# Patient Record
Sex: MALE | Race: WHITE | Employment: OTHER | URBAN - METROPOLITAN AREA
[De-identification: names, ages, dates, MRNs, and addresses within clinical notes are randomized per-mention and may not be internally consistent; named-entity substitution may affect disease eponyms.]

---

## 2017-01-09 RX ORDER — SOTALOL HYDROCHLORIDE 80 MG/1
TABLET ORAL
Qty: 180 TABLET | Refills: 1 | Status: SHIPPED | OUTPATIENT
Start: 2017-01-09 | End: 2017-07-11 | Stop reason: SDUPTHER

## 2017-01-24 ENCOUNTER — OFFICE VISIT (OUTPATIENT)
Dept: INTERNAL MEDICINE CLINIC | Age: 72
End: 2017-01-24

## 2017-01-24 VITALS
WEIGHT: 257 LBS | HEIGHT: 73 IN | HEART RATE: 72 BPM | TEMPERATURE: 98.4 F | OXYGEN SATURATION: 98 % | SYSTOLIC BLOOD PRESSURE: 116 MMHG | BODY MASS INDEX: 34.06 KG/M2 | DIASTOLIC BLOOD PRESSURE: 72 MMHG

## 2017-01-24 DIAGNOSIS — J06.9 ACUTE URI: Primary | ICD-10-CM

## 2017-01-24 DIAGNOSIS — C64.9 RENAL CANCER, UNSPECIFIED LATERALITY (HCC): ICD-10-CM

## 2017-01-24 PROCEDURE — 1123F ACP DISCUSS/DSCN MKR DOCD: CPT | Performed by: INTERNAL MEDICINE

## 2017-01-24 PROCEDURE — G8419 CALC BMI OUT NRM PARAM NOF/U: HCPCS | Performed by: INTERNAL MEDICINE

## 2017-01-24 PROCEDURE — 1036F TOBACCO NON-USER: CPT | Performed by: INTERNAL MEDICINE

## 2017-01-24 PROCEDURE — 4040F PNEUMOC VAC/ADMIN/RCVD: CPT | Performed by: INTERNAL MEDICINE

## 2017-01-24 PROCEDURE — G8427 DOCREV CUR MEDS BY ELIG CLIN: HCPCS | Performed by: INTERNAL MEDICINE

## 2017-01-24 PROCEDURE — 99213 OFFICE O/P EST LOW 20 MIN: CPT | Performed by: INTERNAL MEDICINE

## 2017-01-24 PROCEDURE — G8484 FLU IMMUNIZE NO ADMIN: HCPCS | Performed by: INTERNAL MEDICINE

## 2017-01-24 PROCEDURE — 3017F COLORECTAL CA SCREEN DOC REV: CPT | Performed by: INTERNAL MEDICINE

## 2017-01-24 RX ORDER — HYDROCODONE POLISTIREX AND CHLORPHENIRAMINE POLISTIREX 10; 8 MG/5ML; MG/5ML
5 SUSPENSION, EXTENDED RELEASE ORAL EVERY 12 HOURS PRN
Qty: 120 ML | Refills: 0 | Status: SHIPPED | OUTPATIENT
Start: 2017-01-24 | End: 2017-06-01

## 2017-01-24 RX ORDER — AMOXICILLIN AND CLAVULANATE POTASSIUM 875; 125 MG/1; MG/1
1 TABLET, FILM COATED ORAL 2 TIMES DAILY WITH MEALS
Qty: 20 TABLET | Refills: 0 | Status: SHIPPED | OUTPATIENT
Start: 2017-01-24 | End: 2017-02-03

## 2017-01-24 ASSESSMENT — ENCOUNTER SYMPTOMS
EYES NEGATIVE: 1
RHINORRHEA: 1
CHOKING: 0
APNEA: 0
SORE THROAT: 1
STRIDOR: 0
BACK PAIN: 0
SINUS PRESSURE: 0
WHEEZING: 0
COLOR CHANGE: 0
DIARRHEA: 1
SINUS PAIN: 1
COUGH: 1

## 2017-02-02 ENCOUNTER — TELEPHONE (OUTPATIENT)
Dept: INTERNAL MEDICINE CLINIC | Age: 72
End: 2017-02-02

## 2017-02-02 DIAGNOSIS — B37.0 THRUSH: ICD-10-CM

## 2017-02-02 RX ORDER — OXYBUTYNIN CHLORIDE 5 MG/1
5 TABLET ORAL 3 TIMES DAILY
Qty: 90 TABLET | Refills: 3 | Status: SHIPPED | OUTPATIENT
Start: 2017-02-02 | End: 2017-02-03

## 2017-02-03 ENCOUNTER — TELEPHONE (OUTPATIENT)
Dept: INTERNAL MEDICINE CLINIC | Age: 72
End: 2017-02-03

## 2017-02-03 RX ORDER — LISINOPRIL 20 MG/1
TABLET ORAL
Qty: 90 TABLET | Refills: 0 | Status: SHIPPED | OUTPATIENT
Start: 2017-02-03 | End: 2017-05-09 | Stop reason: SDUPTHER

## 2017-02-03 RX ORDER — OXYBUTYNIN CHLORIDE 5 MG/1
5 TABLET ORAL 3 TIMES DAILY
Qty: 270 TABLET | Refills: 3 | Status: SHIPPED | OUTPATIENT
Start: 2017-02-03 | End: 2017-06-01

## 2017-02-09 ENCOUNTER — TELEPHONE (OUTPATIENT)
Dept: INTERNAL MEDICINE CLINIC | Age: 72
End: 2017-02-09

## 2017-02-09 RX ORDER — ATORVASTATIN CALCIUM 40 MG/1
TABLET, FILM COATED ORAL
Qty: 90 TABLET | Refills: 1 | Status: SHIPPED | OUTPATIENT
Start: 2017-02-09 | End: 2017-08-01 | Stop reason: SDUPTHER

## 2017-05-09 RX ORDER — LISINOPRIL 20 MG/1
TABLET ORAL
Qty: 90 TABLET | Refills: 0 | Status: SHIPPED | OUTPATIENT
Start: 2017-05-09 | End: 2017-08-06 | Stop reason: SDUPTHER

## 2017-05-25 ENCOUNTER — TELEPHONE (OUTPATIENT)
Dept: INTERNAL MEDICINE CLINIC | Age: 72
End: 2017-05-25

## 2017-05-25 DIAGNOSIS — I10 ESSENTIAL HYPERTENSION: ICD-10-CM

## 2017-05-25 DIAGNOSIS — E78.00 PURE HYPERCHOLESTEROLEMIA: ICD-10-CM

## 2017-05-25 DIAGNOSIS — N40.0 BENIGN PROSTATIC HYPERPLASIA, PRESENCE OF LOWER URINARY TRACT SYMPTOMS UNSPECIFIED, UNSPECIFIED MORPHOLOGY: Primary | ICD-10-CM

## 2017-05-25 DIAGNOSIS — Z00.00 ROUTINE GENERAL MEDICAL EXAMINATION AT A HEALTH CARE FACILITY: ICD-10-CM

## 2017-05-26 DIAGNOSIS — B37.0 THRUSH: ICD-10-CM

## 2017-06-01 ENCOUNTER — OFFICE VISIT (OUTPATIENT)
Dept: INTERNAL MEDICINE CLINIC | Age: 72
End: 2017-06-01

## 2017-06-01 VITALS
WEIGHT: 252.5 LBS | BODY MASS INDEX: 33.31 KG/M2 | TEMPERATURE: 98.4 F | SYSTOLIC BLOOD PRESSURE: 136 MMHG | DIASTOLIC BLOOD PRESSURE: 84 MMHG | HEART RATE: 88 BPM

## 2017-06-01 DIAGNOSIS — C64.9 RENAL CANCER, UNSPECIFIED LATERALITY (HCC): ICD-10-CM

## 2017-06-01 DIAGNOSIS — N32.81 OAB (OVERACTIVE BLADDER): Primary | ICD-10-CM

## 2017-06-01 DIAGNOSIS — E78.00 PURE HYPERCHOLESTEROLEMIA: ICD-10-CM

## 2017-06-01 DIAGNOSIS — G40.909 UNSPECIFIED EPILEPSY WITHOUT MENTION OF INTRACTABLE EPILEPSY: ICD-10-CM

## 2017-06-01 PROCEDURE — G8417 CALC BMI ABV UP PARAM F/U: HCPCS | Performed by: INTERNAL MEDICINE

## 2017-06-01 PROCEDURE — 4040F PNEUMOC VAC/ADMIN/RCVD: CPT | Performed by: INTERNAL MEDICINE

## 2017-06-01 PROCEDURE — G8427 DOCREV CUR MEDS BY ELIG CLIN: HCPCS | Performed by: INTERNAL MEDICINE

## 2017-06-01 PROCEDURE — 1036F TOBACCO NON-USER: CPT | Performed by: INTERNAL MEDICINE

## 2017-06-01 PROCEDURE — 3017F COLORECTAL CA SCREEN DOC REV: CPT | Performed by: INTERNAL MEDICINE

## 2017-06-01 PROCEDURE — 99214 OFFICE O/P EST MOD 30 MIN: CPT | Performed by: INTERNAL MEDICINE

## 2017-06-01 PROCEDURE — 1123F ACP DISCUSS/DSCN MKR DOCD: CPT | Performed by: INTERNAL MEDICINE

## 2017-06-01 RX ORDER — RANITIDINE 150 MG/1
150 TABLET ORAL 2 TIMES DAILY
COMMUNITY
End: 2022-04-25 | Stop reason: ALTCHOICE

## 2017-06-01 RX ORDER — SOLIFENACIN SUCCINATE 5 MG/1
5 TABLET, FILM COATED ORAL DAILY
Qty: 30 TABLET | Refills: 3 | Status: SHIPPED | OUTPATIENT
Start: 2017-06-01 | End: 2018-11-21

## 2017-06-01 ASSESSMENT — ENCOUNTER SYMPTOMS
SINUS PRESSURE: 0
CHOKING: 0
APNEA: 0
BACK PAIN: 0
EYES NEGATIVE: 1
COLOR CHANGE: 0
STRIDOR: 0

## 2017-07-11 RX ORDER — SOTALOL HYDROCHLORIDE 80 MG/1
TABLET ORAL
Qty: 180 TABLET | Refills: 1 | Status: SHIPPED | OUTPATIENT
Start: 2017-07-11 | End: 2018-11-21

## 2017-07-13 ENCOUNTER — OFFICE VISIT (OUTPATIENT)
Dept: INTERNAL MEDICINE CLINIC | Age: 72
End: 2017-07-13

## 2017-07-13 VITALS
BODY MASS INDEX: 33.31 KG/M2 | WEIGHT: 252.5 LBS | TEMPERATURE: 98.7 F | SYSTOLIC BLOOD PRESSURE: 122 MMHG | HEART RATE: 78 BPM | DIASTOLIC BLOOD PRESSURE: 78 MMHG | OXYGEN SATURATION: 93 %

## 2017-07-13 DIAGNOSIS — N32.81 OAB (OVERACTIVE BLADDER): ICD-10-CM

## 2017-07-13 DIAGNOSIS — F51.01 PRIMARY INSOMNIA: Primary | ICD-10-CM

## 2017-07-13 DIAGNOSIS — R06.02 SHORTNESS OF BREATH: ICD-10-CM

## 2017-07-13 PROCEDURE — 1123F ACP DISCUSS/DSCN MKR DOCD: CPT | Performed by: INTERNAL MEDICINE

## 2017-07-13 PROCEDURE — 3017F COLORECTAL CA SCREEN DOC REV: CPT | Performed by: INTERNAL MEDICINE

## 2017-07-13 PROCEDURE — 99214 OFFICE O/P EST MOD 30 MIN: CPT | Performed by: INTERNAL MEDICINE

## 2017-07-13 PROCEDURE — G8427 DOCREV CUR MEDS BY ELIG CLIN: HCPCS | Performed by: INTERNAL MEDICINE

## 2017-07-13 PROCEDURE — 1036F TOBACCO NON-USER: CPT | Performed by: INTERNAL MEDICINE

## 2017-07-13 PROCEDURE — G8417 CALC BMI ABV UP PARAM F/U: HCPCS | Performed by: INTERNAL MEDICINE

## 2017-07-13 PROCEDURE — 4040F PNEUMOC VAC/ADMIN/RCVD: CPT | Performed by: INTERNAL MEDICINE

## 2017-07-13 RX ORDER — PHENOL 1.4 %
10 AEROSOL, SPRAY (ML) MUCOUS MEMBRANE DAILY PRN
Qty: 90 TABLET | Refills: 3 | Status: SHIPPED | OUTPATIENT
Start: 2017-07-13 | End: 2018-11-21

## 2017-07-13 RX ORDER — OXYBUTYNIN CHLORIDE 5 MG/1
5 TABLET ORAL 3 TIMES DAILY
Qty: 270 TABLET | Refills: 3 | Status: SHIPPED | OUTPATIENT
Start: 2017-07-13 | End: 2022-04-25

## 2017-07-13 ASSESSMENT — ENCOUNTER SYMPTOMS
WHEEZING: 0
SHORTNESS OF BREATH: 1

## 2017-08-01 RX ORDER — ATORVASTATIN CALCIUM 40 MG/1
TABLET, FILM COATED ORAL
Qty: 90 TABLET | Refills: 1 | Status: SHIPPED | OUTPATIENT
Start: 2017-08-01

## 2017-08-07 RX ORDER — RANITIDINE 150 MG/1
TABLET ORAL
Qty: 180 TABLET | Refills: 1 | Status: SHIPPED | OUTPATIENT
Start: 2017-08-07 | End: 2018-11-21

## 2017-08-07 RX ORDER — LISINOPRIL 20 MG/1
TABLET ORAL
Qty: 90 TABLET | Refills: 0 | Status: SHIPPED | OUTPATIENT
Start: 2017-08-07 | End: 2018-11-21

## 2017-09-28 RX ORDER — FINASTERIDE 5 MG/1
TABLET, FILM COATED ORAL
Qty: 90 TABLET | Refills: 3 | Status: SHIPPED | OUTPATIENT
Start: 2017-09-28 | End: 2018-11-21

## 2017-09-28 RX ORDER — NIFEDIPINE 30 MG/1
TABLET, EXTENDED RELEASE ORAL
Qty: 90 TABLET | Refills: 3 | Status: SHIPPED | OUTPATIENT
Start: 2017-09-28

## 2017-09-28 RX ORDER — TAMSULOSIN HYDROCHLORIDE 0.4 MG/1
CAPSULE ORAL
Qty: 90 CAPSULE | Refills: 3 | Status: SHIPPED | OUTPATIENT
Start: 2017-09-28

## 2017-10-16 ENCOUNTER — HOSPITAL ENCOUNTER (OUTPATIENT)
Dept: NON INVASIVE DIAGNOSTICS | Age: 72
Discharge: HOME | End: 2017-10-16
Attending: INTERNAL MEDICINE | Admitting: INTERNAL MEDICINE

## 2017-10-16 LAB
ALBUMIN SERPL-MCNC: 3.9 G/DL (ref 3.4–5)
ANION GAP SERPL CALCULATED.3IONS-SCNC: 12 MMOL/L (ref 3–16)
BASOPHILS ABSOLUTE: 0.1 K/UL (ref 0–0.2)
BASOPHILS RELATIVE PERCENT: 1.1 %
BUN BLDV-MCNC: 21 MG/DL (ref 7–20)
CALCIUM SERPL-MCNC: 10 MG/DL (ref 8.3–10.6)
CHLORIDE BLD-SCNC: 102 MMOL/L (ref 99–110)
CO2: 25 MMOL/L (ref 21–32)
CREAT SERPL-MCNC: 1.1 MG/DL (ref 0.8–1.3)
CREATININE URINE: 84.7 MG/DL (ref 39–259)
EOSINOPHILS ABSOLUTE: 0.2 K/UL (ref 0–0.6)
EOSINOPHILS RELATIVE PERCENT: 3 %
FERRITIN: 141.7 NG/ML (ref 30–400)
GFR AFRICAN AMERICAN: >60
GFR NON-AFRICAN AMERICAN: >60
GLUCOSE BLD-MCNC: 105 MG/DL (ref 70–99)
HCT VFR BLD CALC: 41.7 % (ref 40.5–52.5)
HEMOGLOBIN: 13.9 G/DL (ref 13.5–17.5)
IRON SATURATION: 28 % (ref 20–50)
IRON: 81 UG/DL (ref 59–158)
LYMPHOCYTES ABSOLUTE: 1.1 K/UL (ref 1–5.1)
LYMPHOCYTES RELATIVE PERCENT: 13.7 %
MCH RBC QN AUTO: 31 PG (ref 26–34)
MCHC RBC AUTO-ENTMCNC: 33.4 G/DL (ref 31–36)
MCV RBC AUTO: 92.8 FL (ref 80–100)
MONOCYTES ABSOLUTE: 0.5 K/UL (ref 0–1.3)
MONOCYTES RELATIVE PERCENT: 5.7 %
NEUTROPHILS ABSOLUTE: 6.3 K/UL (ref 1.7–7.7)
NEUTROPHILS RELATIVE PERCENT: 76.5 %
PARATHYROID HORMONE INTACT: 92.4 PG/ML (ref 14–72)
PDW BLD-RTO: 13.6 % (ref 12.4–15.4)
PHOSPHORUS: 3.1 MG/DL (ref 2.5–4.9)
PLATELET # BLD: 232 K/UL (ref 135–450)
PMV BLD AUTO: 7.3 FL (ref 5–10.5)
POTASSIUM SERPL-SCNC: 5.3 MMOL/L (ref 3.5–5.1)
PROTEIN PROTEIN: 16 MG/DL
RBC # BLD: 4.5 M/UL (ref 4.2–5.9)
SODIUM BLD-SCNC: 139 MMOL/L (ref 136–145)
TOTAL IRON BINDING CAPACITY: 286 UG/DL (ref 260–445)
URIC ACID, SERUM: 6.8 MG/DL (ref 3.5–7.2)
VITAMIN D 25-HYDROXY: 38 NG/ML
WBC # BLD: 8.3 K/UL (ref 4–11)

## 2017-11-07 RX ORDER — LISINOPRIL 20 MG/1
TABLET ORAL
Qty: 90 TABLET | Refills: 0 | OUTPATIENT
Start: 2017-11-07

## 2018-01-08 DIAGNOSIS — N32.81 OAB (OVERACTIVE BLADDER): ICD-10-CM

## 2018-01-08 RX ORDER — SOLIFENACIN SUCCINATE 5 MG/1
5 TABLET, FILM COATED ORAL DAILY
Qty: 30 TABLET | Refills: 3 | OUTPATIENT
Start: 2018-01-08

## 2018-06-27 ENCOUNTER — HOSPITAL ENCOUNTER (OUTPATIENT)
Dept: NON INVASIVE DIAGNOSTICS | Age: 73
Discharge: HOME | End: 2018-06-27
Attending: INTERNAL MEDICINE | Admitting: INTERNAL MEDICINE

## 2018-06-27 LAB
ALBUMIN SERPL-MCNC: 4.4 G/DL (ref 3.4–5)
ANION GAP SERPL CALCULATED.3IONS-SCNC: 13 MMOL/L (ref 3–16)
BASOPHILS ABSOLUTE: 0.1 K/UL (ref 0–0.2)
BASOPHILS RELATIVE PERCENT: 1 %
BUN BLDV-MCNC: 24 MG/DL (ref 7–20)
CALCIUM SERPL-MCNC: 10.2 MG/DL (ref 8.3–10.6)
CHLORIDE BLD-SCNC: 104 MMOL/L (ref 99–110)
CO2: 24 MMOL/L (ref 21–32)
CREAT SERPL-MCNC: 1.2 MG/DL (ref 0.8–1.3)
CREATININE URINE: 97.9 MG/DL (ref 39–259)
EOSINOPHILS ABSOLUTE: 0.3 K/UL (ref 0–0.6)
EOSINOPHILS RELATIVE PERCENT: 3.8 %
FERRITIN: 62.5 NG/ML (ref 30–400)
GFR AFRICAN AMERICAN: >60
GFR NON-AFRICAN AMERICAN: 59
GLUCOSE BLD-MCNC: 134 MG/DL (ref 70–99)
HCT VFR BLD CALC: 41.5 % (ref 40.5–52.5)
HEMOGLOBIN: 14.4 G/DL (ref 13.5–17.5)
IRON: 68 UG/DL (ref 59–158)
LYMPHOCYTES ABSOLUTE: 1.2 K/UL (ref 1–5.1)
LYMPHOCYTES RELATIVE PERCENT: 16.8 %
MCH RBC QN AUTO: 31.1 PG (ref 26–34)
MCHC RBC AUTO-ENTMCNC: 34.7 G/DL (ref 31–36)
MCV RBC AUTO: 89.7 FL (ref 80–100)
MONOCYTES ABSOLUTE: 0.4 K/UL (ref 0–1.3)
MONOCYTES RELATIVE PERCENT: 5.6 %
NEUTROPHILS ABSOLUTE: 5.3 K/UL (ref 1.7–7.7)
NEUTROPHILS RELATIVE PERCENT: 72.8 %
PARATHYROID HORMONE INTACT: 90.8 PG/ML (ref 14–72)
PDW BLD-RTO: 14.9 % (ref 12.4–15.4)
PHOSPHORUS: 2.3 MG/DL (ref 2.5–4.9)
PLATELET # BLD: 185 K/UL (ref 135–450)
PMV BLD AUTO: 8 FL (ref 5–10.5)
POTASSIUM SERPL-SCNC: 4.2 MMOL/L (ref 3.5–5.1)
RBC # BLD: 4.63 M/UL (ref 4.2–5.9)
SODIUM BLD-SCNC: 141 MMOL/L (ref 136–145)
TOTAL IRON BINDING CAPACITY: 327 UG/DL (ref 260–445)
VITAMIN D 25-HYDROXY: 46.7 NG/ML
WBC # BLD: 7.4 K/UL (ref 4–11)

## 2018-11-21 ENCOUNTER — APPOINTMENT (OUTPATIENT)
Dept: CT IMAGING | Age: 73
End: 2018-11-21
Payer: MEDICARE

## 2018-11-21 ENCOUNTER — APPOINTMENT (OUTPATIENT)
Dept: GENERAL RADIOLOGY | Age: 73
End: 2018-11-21
Payer: MEDICARE

## 2018-11-21 ENCOUNTER — HOSPITAL ENCOUNTER (OUTPATIENT)
Age: 73
Setting detail: OBSERVATION
Discharge: PSYCHIATRIC OR SUBSTANCE USE FACILITY | End: 2018-11-25
Attending: EMERGENCY MEDICINE | Admitting: INTERNAL MEDICINE
Payer: MEDICARE

## 2018-11-21 DIAGNOSIS — R53.1 GENERAL WEAKNESS: ICD-10-CM

## 2018-11-21 DIAGNOSIS — R46.89 BEHAVIORAL CHANGE: ICD-10-CM

## 2018-11-21 DIAGNOSIS — Z91.14 NONCOMPLIANCE WITH MEDICATION REGIMEN: ICD-10-CM

## 2018-11-21 DIAGNOSIS — R41.82 ALTERED MENTAL STATUS, UNSPECIFIED ALTERED MENTAL STATUS TYPE: ICD-10-CM

## 2018-11-21 DIAGNOSIS — R77.8 ELEVATED TROPONIN: Primary | ICD-10-CM

## 2018-11-21 PROBLEM — R07.9 CHEST PAIN: Status: ACTIVE | Noted: 2018-11-21

## 2018-11-21 LAB
ACETAMINOPHEN LEVEL: <5 UG/ML (ref 10–30)
ALBUMIN SERPL-MCNC: 3.5 G/DL (ref 3.4–5)
ALP BLD-CCNC: 72 U/L (ref 40–129)
ALT SERPL-CCNC: 20 U/L (ref 10–40)
AMPHETAMINE SCREEN, URINE: NORMAL
ANION GAP SERPL CALCULATED.3IONS-SCNC: 15 MMOL/L (ref 3–16)
AST SERPL-CCNC: 26 U/L (ref 15–37)
BARBITURATE SCREEN URINE: NORMAL
BASOPHILS ABSOLUTE: 0.1 K/UL (ref 0–0.2)
BASOPHILS RELATIVE PERCENT: 1 %
BENZODIAZEPINE SCREEN, URINE: NORMAL
BILIRUB SERPL-MCNC: 0.6 MG/DL (ref 0–1)
BILIRUBIN DIRECT: <0.2 MG/DL (ref 0–0.3)
BILIRUBIN URINE: ABNORMAL
BILIRUBIN, INDIRECT: NORMAL MG/DL (ref 0–1)
BLOOD, URINE: NEGATIVE
BUN BLDV-MCNC: 50 MG/DL (ref 7–20)
CALCIUM IONIZED: 1.27 MMOL/L (ref 1.12–1.32)
CALCIUM SERPL-MCNC: 11 MG/DL (ref 8.3–10.6)
CANNABINOID SCREEN URINE: NORMAL
CHLORIDE BLD-SCNC: 107 MMOL/L (ref 99–110)
CLARITY: ABNORMAL
CO2: 21 MMOL/L (ref 21–32)
COCAINE METABOLITE SCREEN URINE: NORMAL
COLOR: YELLOW
CREAT SERPL-MCNC: 1.4 MG/DL (ref 0.8–1.3)
CRYSTALS, UA: ABNORMAL /HPF
EKG ATRIAL RATE: 28 BPM
EKG DIAGNOSIS: NORMAL
EKG Q-T INTERVAL: 550 MS
EKG QRS DURATION: 150 MS
EKG QTC CALCULATION (BAZETT): 610 MS
EKG R AXIS: -69 DEGREES
EKG T AXIS: -21 DEGREES
EKG VENTRICULAR RATE: 74 BPM
EOSINOPHILS ABSOLUTE: 0.1 K/UL (ref 0–0.6)
EOSINOPHILS RELATIVE PERCENT: 0.7 %
EPITHELIAL CELLS, UA: 1 /HPF (ref 0–5)
ETHANOL: NORMAL MG/DL (ref 0–0.08)
GFR AFRICAN AMERICAN: 60
GFR NON-AFRICAN AMERICAN: 49
GLUCOSE BLD-MCNC: 149 MG/DL (ref 70–99)
GLUCOSE BLD-MCNC: 97 MG/DL (ref 70–99)
GLUCOSE URINE: NEGATIVE MG/DL
HCT VFR BLD CALC: 52.1 % (ref 40.5–52.5)
HEMOGLOBIN: 16.6 G/DL (ref 13.5–17.5)
HYALINE CASTS: 4 /LPF (ref 0–8)
KETONES, URINE: 15 MG/DL
LACTIC ACID: 1.8 MMOL/L (ref 0.4–2)
LEUKOCYTE ESTERASE, URINE: NEGATIVE
LYMPHOCYTES ABSOLUTE: 1.3 K/UL (ref 1–5.1)
LYMPHOCYTES RELATIVE PERCENT: 11.1 %
Lab: NORMAL
MAGNESIUM: <0.2 MG/DL (ref 1.8–2.4)
MCH RBC QN AUTO: 28 PG (ref 26–34)
MCHC RBC AUTO-ENTMCNC: 31.8 G/DL (ref 31–36)
MCV RBC AUTO: 87.9 FL (ref 80–100)
METHADONE SCREEN, URINE: NORMAL
MICROSCOPIC EXAMINATION: YES
MONOCYTES ABSOLUTE: 0.9 K/UL (ref 0–1.3)
MONOCYTES RELATIVE PERCENT: 8.3 %
NEUTROPHILS ABSOLUTE: 8.9 K/UL (ref 1.7–7.7)
NEUTROPHILS RELATIVE PERCENT: 78.9 %
NITRITE, URINE: NEGATIVE
OPIATE SCREEN URINE: NORMAL
OXYCODONE URINE: NORMAL
PDW BLD-RTO: 15.9 % (ref 12.4–15.4)
PERFORMED ON: ABNORMAL
PH UA: 5
PH UA: 5
PH VENOUS: 7.41 (ref 7.35–7.45)
PHENCYCLIDINE SCREEN URINE: NORMAL
PLATELET # BLD: 227 K/UL (ref 135–450)
PMV BLD AUTO: 8.6 FL (ref 5–10.5)
POTASSIUM SERPL-SCNC: 5.4 MMOL/L (ref 3.5–5.1)
PRO-BNP: 1136 PG/ML (ref 0–449)
PROPOXYPHENE SCREEN: NORMAL
PROTEIN UA: NEGATIVE MG/DL
RBC # BLD: 5.93 M/UL (ref 4.2–5.9)
RBC UA: 4 /HPF (ref 0–4)
SODIUM BLD-SCNC: 143 MMOL/L (ref 136–145)
SPECIFIC GRAVITY UA: 1.02
TOTAL PROTEIN: 6.8 G/DL (ref 6.4–8.2)
TROPONIN: 0.04 NG/ML
TROPONIN: 0.05 NG/ML
URINE REFLEX TO CULTURE: ABNORMAL
URINE TYPE: ABNORMAL
UROBILINOGEN, URINE: 1 E.U./DL
WBC # BLD: 11.3 K/UL (ref 4–11)
WBC UA: 1 /HPF (ref 0–5)

## 2018-11-21 PROCEDURE — 81001 URINALYSIS AUTO W/SCOPE: CPT

## 2018-11-21 PROCEDURE — 85025 COMPLETE CBC W/AUTO DIFF WBC: CPT

## 2018-11-21 PROCEDURE — 6370000000 HC RX 637 (ALT 250 FOR IP): Performed by: INTERNAL MEDICINE

## 2018-11-21 PROCEDURE — 83605 ASSAY OF LACTIC ACID: CPT

## 2018-11-21 PROCEDURE — G0480 DRUG TEST DEF 1-7 CLASSES: HCPCS

## 2018-11-21 PROCEDURE — 87040 BLOOD CULTURE FOR BACTERIA: CPT

## 2018-11-21 PROCEDURE — 93010 ELECTROCARDIOGRAM REPORT: CPT | Performed by: INTERNAL MEDICINE

## 2018-11-21 PROCEDURE — 80076 HEPATIC FUNCTION PANEL: CPT

## 2018-11-21 PROCEDURE — 96361 HYDRATE IV INFUSION ADD-ON: CPT

## 2018-11-21 PROCEDURE — 96375 TX/PRO/DX INJ NEW DRUG ADDON: CPT

## 2018-11-21 PROCEDURE — 36415 COLL VENOUS BLD VENIPUNCTURE: CPT

## 2018-11-21 PROCEDURE — 2580000003 HC RX 258: Performed by: INTERNAL MEDICINE

## 2018-11-21 PROCEDURE — 6370000000 HC RX 637 (ALT 250 FOR IP): Performed by: EMERGENCY MEDICINE

## 2018-11-21 PROCEDURE — 71046 X-RAY EXAM CHEST 2 VIEWS: CPT

## 2018-11-21 PROCEDURE — G0378 HOSPITAL OBSERVATION PER HR: HCPCS

## 2018-11-21 PROCEDURE — 2580000003 HC RX 258: Performed by: NURSE PRACTITIONER

## 2018-11-21 PROCEDURE — 84484 ASSAY OF TROPONIN QUANT: CPT

## 2018-11-21 PROCEDURE — 93005 ELECTROCARDIOGRAM TRACING: CPT | Performed by: NURSE PRACTITIONER

## 2018-11-21 PROCEDURE — 80048 BASIC METABOLIC PNL TOTAL CA: CPT

## 2018-11-21 PROCEDURE — 70450 CT HEAD/BRAIN W/O DYE: CPT

## 2018-11-21 PROCEDURE — 6360000002 HC RX W HCPCS: Performed by: INTERNAL MEDICINE

## 2018-11-21 PROCEDURE — 80307 DRUG TEST PRSMV CHEM ANLYZR: CPT

## 2018-11-21 PROCEDURE — 96360 HYDRATION IV INFUSION INIT: CPT

## 2018-11-21 PROCEDURE — 96365 THER/PROPH/DIAG IV INF INIT: CPT

## 2018-11-21 PROCEDURE — 2700000000 HC OXYGEN THERAPY PER DAY

## 2018-11-21 PROCEDURE — 83735 ASSAY OF MAGNESIUM: CPT

## 2018-11-21 PROCEDURE — 83880 ASSAY OF NATRIURETIC PEPTIDE: CPT

## 2018-11-21 PROCEDURE — 99285 EMERGENCY DEPT VISIT HI MDM: CPT

## 2018-11-21 PROCEDURE — 82330 ASSAY OF CALCIUM: CPT

## 2018-11-21 RX ORDER — HALOPERIDOL 5 MG
5 TABLET ORAL EVERY 4 HOURS PRN
COMMUNITY
End: 2022-04-25 | Stop reason: ALTCHOICE

## 2018-11-21 RX ORDER — QUETIAPINE FUMARATE 100 MG/1
100 TABLET, FILM COATED ORAL NIGHTLY
COMMUNITY
End: 2022-04-25 | Stop reason: ALTCHOICE

## 2018-11-21 RX ORDER — SODIUM CHLORIDE 0.9 % (FLUSH) 0.9 %
10 SYRINGE (ML) INJECTION EVERY 12 HOURS SCHEDULED
Status: DISCONTINUED | OUTPATIENT
Start: 2018-11-21 | End: 2018-11-25 | Stop reason: HOSPADM

## 2018-11-21 RX ORDER — ONDANSETRON 2 MG/ML
4 INJECTION INTRAMUSCULAR; INTRAVENOUS EVERY 4 HOURS PRN
COMMUNITY
End: 2022-04-25 | Stop reason: ALTCHOICE

## 2018-11-21 RX ORDER — HYDROXYZINE 50 MG/1
50 TABLET, FILM COATED ORAL EVERY 4 HOURS PRN
COMMUNITY
End: 2018-11-21 | Stop reason: DRUGHIGH

## 2018-11-21 RX ORDER — MAGNESIUM SULFATE 1 G/100ML
1 INJECTION INTRAVENOUS PRN
Status: DISCONTINUED | OUTPATIENT
Start: 2018-11-21 | End: 2018-11-25 | Stop reason: HOSPADM

## 2018-11-21 RX ORDER — DEXTROSE AND SODIUM CHLORIDE 5; .45 G/100ML; G/100ML
INJECTION, SOLUTION INTRAVENOUS CONTINUOUS
Status: DISCONTINUED | OUTPATIENT
Start: 2018-11-21 | End: 2018-11-25 | Stop reason: HOSPADM

## 2018-11-21 RX ORDER — POTASSIUM CHLORIDE 20 MEQ/1
40 TABLET, EXTENDED RELEASE ORAL PRN
Status: DISCONTINUED | OUTPATIENT
Start: 2018-11-21 | End: 2018-11-25 | Stop reason: HOSPADM

## 2018-11-21 RX ORDER — NICOTINE POLACRILEX 4 MG
15 LOZENGE BUCCAL PRN
Status: DISCONTINUED | OUTPATIENT
Start: 2018-11-21 | End: 2018-11-25 | Stop reason: HOSPADM

## 2018-11-21 RX ORDER — LOPERAMIDE HYDROCHLORIDE 2 MG/1
4 CAPSULE ORAL PRN
Status: DISCONTINUED | OUTPATIENT
Start: 2018-11-21 | End: 2018-11-25 | Stop reason: HOSPADM

## 2018-11-21 RX ORDER — HALOPERIDOL 5 MG/ML
5 INJECTION INTRAMUSCULAR EVERY 4 HOURS PRN
COMMUNITY
End: 2022-04-25 | Stop reason: ALTCHOICE

## 2018-11-21 RX ORDER — ONDANSETRON 4 MG/1
4 TABLET, ORALLY DISINTEGRATING ORAL EVERY 4 HOURS PRN
COMMUNITY
End: 2022-04-25 | Stop reason: ALTCHOICE

## 2018-11-21 RX ORDER — ALBUTEROL SULFATE 90 UG/1
2 AEROSOL, METERED RESPIRATORY (INHALATION) EVERY 6 HOURS PRN
COMMUNITY

## 2018-11-21 RX ORDER — TRAZODONE HYDROCHLORIDE 50 MG/1
50 TABLET ORAL NIGHTLY PRN
Status: DISCONTINUED | OUTPATIENT
Start: 2018-11-21 | End: 2018-11-25 | Stop reason: HOSPADM

## 2018-11-21 RX ORDER — OLANZAPINE 10 MG/1
10 TABLET ORAL NIGHTLY
COMMUNITY
End: 2022-04-25 | Stop reason: ALTCHOICE

## 2018-11-21 RX ORDER — 0.9 % SODIUM CHLORIDE 0.9 %
1000 INTRAVENOUS SOLUTION INTRAVENOUS ONCE
Status: COMPLETED | OUTPATIENT
Start: 2018-11-21 | End: 2018-11-21

## 2018-11-21 RX ORDER — LORAZEPAM 1 MG/1
1 TABLET ORAL NIGHTLY PRN
Status: DISCONTINUED | OUTPATIENT
Start: 2018-11-21 | End: 2018-11-25 | Stop reason: HOSPADM

## 2018-11-21 RX ORDER — CLONIDINE HYDROCHLORIDE 0.2 MG/1
0.2 TABLET ORAL EVERY 8 HOURS PRN
COMMUNITY
End: 2022-04-25 | Stop reason: ALTCHOICE

## 2018-11-21 RX ORDER — ALBUTEROL SULFATE 90 UG/1
2 AEROSOL, METERED RESPIRATORY (INHALATION) EVERY 6 HOURS PRN
Status: DISCONTINUED | OUTPATIENT
Start: 2018-11-21 | End: 2018-11-25 | Stop reason: HOSPADM

## 2018-11-21 RX ORDER — IBUPROFEN 400 MG/1
400 TABLET ORAL EVERY 6 HOURS PRN
COMMUNITY
End: 2022-04-25 | Stop reason: ALTCHOICE

## 2018-11-21 RX ORDER — QUETIAPINE FUMARATE 100 MG/1
100 TABLET, FILM COATED ORAL 2 TIMES DAILY
Status: DISCONTINUED | OUTPATIENT
Start: 2018-11-21 | End: 2018-11-21

## 2018-11-21 RX ORDER — FUROSEMIDE 10 MG/ML
20 INJECTION INTRAMUSCULAR; INTRAVENOUS ONCE
Status: COMPLETED | OUTPATIENT
Start: 2018-11-21 | End: 2018-11-21

## 2018-11-21 RX ORDER — NIFEDIPINE 30 MG/1
30 TABLET, EXTENDED RELEASE ORAL DAILY
Status: DISCONTINUED | OUTPATIENT
Start: 2018-11-22 | End: 2018-11-25 | Stop reason: HOSPADM

## 2018-11-21 RX ORDER — HYDROXYZINE HYDROCHLORIDE 10 MG/1
50 TABLET, FILM COATED ORAL EVERY 4 HOURS PRN
Status: DISCONTINUED | OUTPATIENT
Start: 2018-11-21 | End: 2018-11-25 | Stop reason: HOSPADM

## 2018-11-21 RX ORDER — DEXTROSE MONOHYDRATE 50 MG/ML
100 INJECTION, SOLUTION INTRAVENOUS PRN
Status: DISCONTINUED | OUTPATIENT
Start: 2018-11-21 | End: 2018-11-25 | Stop reason: HOSPADM

## 2018-11-21 RX ORDER — BENZTROPINE MESYLATE 1 MG/ML
1 INJECTION INTRAMUSCULAR; INTRAVENOUS EVERY 4 HOURS PRN
COMMUNITY
End: 2022-04-25

## 2018-11-21 RX ORDER — ATORVASTATIN CALCIUM 40 MG/1
40 TABLET, FILM COATED ORAL NIGHTLY
Status: DISCONTINUED | OUTPATIENT
Start: 2018-11-21 | End: 2018-11-25 | Stop reason: HOSPADM

## 2018-11-21 RX ORDER — CLONIDINE HYDROCHLORIDE 0.1 MG/1
0.2 TABLET ORAL ONCE
Status: COMPLETED | OUTPATIENT
Start: 2018-11-21 | End: 2018-11-21

## 2018-11-21 RX ORDER — NIFEDIPINE 30 MG/1
30 TABLET, EXTENDED RELEASE ORAL ONCE
Status: COMPLETED | OUTPATIENT
Start: 2018-11-21 | End: 2018-11-21

## 2018-11-21 RX ORDER — MAGNESIUM HYDROXIDE/ALUMINUM HYDROXICE/SIMETHICONE 120; 1200; 1200 MG/30ML; MG/30ML; MG/30ML
30 SUSPENSION ORAL EVERY 6 HOURS PRN
COMMUNITY
End: 2022-04-25 | Stop reason: ALTCHOICE

## 2018-11-21 RX ORDER — SODIUM CHLORIDE 0.9 % (FLUSH) 0.9 %
10 SYRINGE (ML) INJECTION PRN
Status: DISCONTINUED | OUTPATIENT
Start: 2018-11-21 | End: 2018-11-25 | Stop reason: HOSPADM

## 2018-11-21 RX ORDER — OLANZAPINE 5 MG/1
10 TABLET ORAL NIGHTLY
Status: DISCONTINUED | OUTPATIENT
Start: 2018-11-21 | End: 2018-11-25 | Stop reason: HOSPADM

## 2018-11-21 RX ORDER — HYDRALAZINE HYDROCHLORIDE 20 MG/ML
5 INJECTION INTRAMUSCULAR; INTRAVENOUS EVERY 6 HOURS PRN
Status: DISCONTINUED | OUTPATIENT
Start: 2018-11-21 | End: 2018-11-25 | Stop reason: HOSPADM

## 2018-11-21 RX ORDER — LORAZEPAM 2 MG/ML
1 INJECTION INTRAMUSCULAR EVERY 4 HOURS PRN
COMMUNITY
End: 2022-04-25

## 2018-11-21 RX ORDER — BENZTROPINE MESYLATE 1 MG/1
1 TABLET ORAL EVERY 4 HOURS PRN
COMMUNITY
End: 2022-04-25 | Stop reason: ALTCHOICE

## 2018-11-21 RX ORDER — DEXTROSE MONOHYDRATE 25 G/50ML
12.5 INJECTION, SOLUTION INTRAVENOUS PRN
Status: DISCONTINUED | OUTPATIENT
Start: 2018-11-21 | End: 2018-11-25 | Stop reason: HOSPADM

## 2018-11-21 RX ORDER — LORAZEPAM 1 MG/1
1 TABLET ORAL EVERY 4 HOURS PRN
COMMUNITY
End: 2022-04-25

## 2018-11-21 RX ORDER — ASPIRIN 81 MG/1
81 TABLET, CHEWABLE ORAL DAILY
Status: DISCONTINUED | OUTPATIENT
Start: 2018-11-22 | End: 2018-11-25 | Stop reason: HOSPADM

## 2018-11-21 RX ORDER — LOPERAMIDE HYDROCHLORIDE 2 MG/1
4 CAPSULE ORAL PRN
COMMUNITY
End: 2022-04-25

## 2018-11-21 RX ORDER — ACETAMINOPHEN 325 MG/1
650 TABLET ORAL EVERY 6 HOURS PRN
Status: DISCONTINUED | OUTPATIENT
Start: 2018-11-21 | End: 2018-11-25 | Stop reason: HOSPADM

## 2018-11-21 RX ORDER — ONDANSETRON HYDROCHLORIDE 4 MG/5ML
SOLUTION ORAL ONCE
COMMUNITY
End: 2018-11-21 | Stop reason: ALTCHOICE

## 2018-11-21 RX ORDER — HYDROXYZINE PAMOATE 50 MG/1
50 CAPSULE ORAL EVERY 6 HOURS PRN
COMMUNITY
End: 2022-04-25

## 2018-11-21 RX ORDER — CLONIDINE HYDROCHLORIDE 0.1 MG/1
0.2 TABLET ORAL 3 TIMES DAILY
Status: DISCONTINUED | OUTPATIENT
Start: 2018-11-21 | End: 2018-11-25 | Stop reason: HOSPADM

## 2018-11-21 RX ORDER — CALCIUM CARBONATE 200(500)MG
1 TABLET,CHEWABLE ORAL EVERY 4 HOURS PRN
COMMUNITY

## 2018-11-21 RX ORDER — ONDANSETRON 2 MG/ML
4 INJECTION INTRAMUSCULAR; INTRAVENOUS EVERY 6 HOURS PRN
Status: DISCONTINUED | OUTPATIENT
Start: 2018-11-21 | End: 2018-11-25 | Stop reason: HOSPADM

## 2018-11-21 RX ORDER — POTASSIUM CHLORIDE 7.45 MG/ML
10 INJECTION INTRAVENOUS PRN
Status: DISCONTINUED | OUTPATIENT
Start: 2018-11-21 | End: 2018-11-25 | Stop reason: HOSPADM

## 2018-11-21 RX ORDER — SERTRALINE HYDROCHLORIDE 100 MG/1
100 TABLET, FILM COATED ORAL 2 TIMES DAILY
COMMUNITY

## 2018-11-21 RX ORDER — ATORVASTATIN CALCIUM 40 MG/1
40 TABLET, FILM COATED ORAL NIGHTLY
Status: DISCONTINUED | OUTPATIENT
Start: 2018-11-21 | End: 2018-11-21 | Stop reason: SDUPTHER

## 2018-11-21 RX ADMIN — CLONIDINE HYDROCHLORIDE 0.2 MG: 0.1 TABLET ORAL at 15:19

## 2018-11-21 RX ADMIN — Medication 10 ML: at 22:06

## 2018-11-21 RX ADMIN — FUROSEMIDE 20 MG: 10 INJECTION, SOLUTION INTRAMUSCULAR; INTRAVENOUS at 22:06

## 2018-11-21 RX ADMIN — SODIUM CHLORIDE 1000 ML: 9 INJECTION, SOLUTION INTRAVENOUS at 13:07

## 2018-11-21 RX ADMIN — DEXTROSE AND SODIUM CHLORIDE: 5; 450 INJECTION, SOLUTION INTRAVENOUS at 22:06

## 2018-11-21 RX ADMIN — TRAZODONE HYDROCHLORIDE 50 MG: 50 TABLET ORAL at 22:06

## 2018-11-21 RX ADMIN — CLONIDINE HYDROCHLORIDE 0.2 MG: 0.1 TABLET ORAL at 22:06

## 2018-11-21 RX ADMIN — DESMOPRESSIN ACETATE 40 MG: 0.2 TABLET ORAL at 22:06

## 2018-11-21 RX ADMIN — MAGNESIUM SULFATE HEPTAHYDRATE 1 G: 1 INJECTION, SOLUTION INTRAVENOUS at 23:33

## 2018-11-21 RX ADMIN — OLANZAPINE 10 MG: 5 TABLET, FILM COATED ORAL at 22:18

## 2018-11-21 RX ADMIN — NIFEDIPINE 30 MG: 30 TABLET, FILM COATED, EXTENDED RELEASE ORAL at 15:19

## 2018-11-21 RX ADMIN — LORAZEPAM 1 MG: 1 TABLET ORAL at 18:04

## 2018-11-21 ASSESSMENT — PAIN SCALES - GENERAL: PAINLEVEL_OUTOF10: 4

## 2018-11-21 ASSESSMENT — ENCOUNTER SYMPTOMS
EYE ITCHING: 0
SORE THROAT: 0
DIARRHEA: 0
VOMITING: 0
EYE DISCHARGE: 0
RHINORRHEA: 0
CONSTIPATION: 0
COUGH: 1
CHEST TIGHTNESS: 1
NAUSEA: 0
SHORTNESS OF BREATH: 0
BLOOD IN STOOL: 0
ABDOMINAL PAIN: 0

## 2018-11-21 NOTE — ED NOTES
Nurse on the floor will have to call back about getting report on this pt.        Mayur Frye RN  11/21/18 0530

## 2018-11-21 NOTE — ED PROVIDER NOTES
I independently performed a history and physical on Sagrario Mcintosh. All diagnostic, treatment, and disposition decisions were made by myself in conjunction with the advanced practice provider. Briefly, this is a 74 y.o. male here for not taking medications, not really eating or drinking and not really speaking as much as he normally does. On exam,  Patient is awake and alert. Lung sounds clear and equal bilaterally. Heart regular rate and rhythm no murmur or gallop. Abdomen soft supple nontender. Patient has a flat affect. EKG  1146: Atrial fibrillation rate of 74, left axis deviation, right bundle branch block, significant artifact limiting evaluation. No previous for comparison. 1347: Sinus rhythm first degree AV block rate of 62 left axis deviation, right bundle branch block, no ST elevations or depressions, improved from previous EKG of earlier today. Screenings            MDM  Patient with a history of psychiatric disorder presents at this time with decompensation. The differential diagnoses includes psychiatric as well as organic causes of the disorder. I consider the possibility of underlying infection, electrolyte disorders, glycemic abnormalities, intracranial pathology, as well as drug interactions. The patient undergoes diagnostic workup here. Laboratory data and appropriate intervention were rendered. Please see SUSAN note for further details    Patient's labs are reviewed does have an elevated BNP with an x-ray not suggestive of fluid overload. Does have a slight elevation of his troponin 0.05. Unclear if this is the cause of this weakness however given this abnormality in the setting of his weakness not taking his medications being dehydrated will admit him to the hospital for further evaluation. Patient's blood pressure also has continued to likely due to not having taken his blood pressure medications last couple of days.   Patient is given his oral blood pressure medications here and will continue to reevaluate. Spoke with hospitalist for admission    CRITICAL CARE    I spent   35      minutes in addition to separately billable procedures. A high probability of sudden, clinically significant, or life-threatening deterioration required my full and direct attention, intervention and personal management while the patient was critical. I provided critical care services including those noted below:  Interpreting diagnostic studies  The medication orders and management  Vital signs assessment monitoring and reviewed  Reevaluation  Obtaining additional history from family, a physician, or EMS  Documentation time  Old chart reviewed  Chart data review    The aggregate critical care time was       35     minutes in direct care for this patient either at the bedside or elsewhere in the emergency department. This time is in addition to the time spent performing other reported procedures. Patient Referrals:  No follow-up provider specified. Discharge Medications:  New Prescriptions    No medications on file       FINAL IMPRESSION  1. Elevated troponin    2. General weakness    3. Behavioral change    4. Altered mental status, unspecified altered mental status type    5. Noncompliance with medication regimen        Blood pressure (!) 187/91, pulse 63, temperature 97.9 °F (36.6 °C), temperature source Oral, resp. rate 17, height 6' 1\" (1.854 m), weight 244 lb (110.7 kg), SpO2 99 %.      [facility], please see documentation by advanced practice provider       Chastity Moreno MD  11/21/18

## 2018-11-21 NOTE — ED PROVIDER NOTES
751-210-39 MG/5ML SUSP SUSPENSION    Take 30 mLs by mouth every 6 hours as needed for Indigestion    ATORVASTATIN (LIPITOR) 40 MG TABLET    TAKE ONE TABLET BY MOUTH EVERY EVENING    BENZOCAINE-MENTHOL (CEPACOL SORE THROAT) 15-3.6 MG LOZENGE    Take 1 lozenge by mouth every 2 hours as needed for Sore Throat    BENZTROPINE (COGENTIN) 1 MG TABLET    Take 1 mg by mouth every 4 hours as needed    BENZTROPINE MESYLATE (COGENTIN) 1 MG/ML INJECTION    Inject 1 mg into the muscle every 4 hours as needed    CALCIUM CARBONATE (TUMS) 500 MG CHEWABLE TABLET    Take 1 tablet by mouth every 4 hours as needed for Heartburn    CLONIDINE (CATAPRES) 0.2 MG TABLET    Take 0.2 mg by mouth every 8 hours as needed    HALOPERIDOL (HALDOL) 5 MG TABLET    Take 5 mg by mouth every 4 hours as needed for Agitation    HALOPERIDOL LACTATE (HALDOL) 5 MG/ML INJECTION    Inject 5 mg into the muscle every 4 hours as needed for Agitation    HYDROXYZINE (ATARAX) 50 MG TABLET    Take 50 mg by mouth every 4 hours as needed for Itching    IBUPROFEN (ADVIL;MOTRIN) 400 MG TABLET    Take 400 mg by mouth every 6 hours as needed for Pain    LOPERAMIDE (IMODIUM) 2 MG CAPSULE    Take 4 mg by mouth as needed for Diarrhea    LORAZEPAM (ATIVAN) 2 MG/ML INJECTION    Infuse 1 mg intravenously every 4 hours as needed. Meri Frey     NIFEDIPINE (PROCARDIA XL) 30 MG EXTENDED RELEASE TABLET    TAKE 1 TABLET BY MOUTH DAILY    NUTRITIONAL SUPPLEMENTS (BOOST HIGH PROTEIN PO)    Take by mouth    OLANZAPINE (ZYPREXA) 10 MG TABLET    Take 10 mg by mouth nightly    ONDANSETRON (ZOFRAN) 4 MG/2ML INJECTION    Infuse 4 mg intravenously every 4 hours as needed for Nausea or Vomiting    ONDANSETRON (ZOFRAN) 4 MG/5ML SOLUTION    Take by mouth once    ONDANSETRON (ZOFRAN-ODT) 4 MG DISINTEGRATING TABLET    Take 4 mg by mouth every 4 hours as needed for Nausea or Vomiting    OXYBUTYNIN (DITROPAN) 5 MG TABLET    Take 1 tablet by mouth 3 times daily    QUETIAPINE (SEROQUEL) 100 MG TABLET    Take 100 mg by mouth 2 times daily    RANITIDINE (ZANTAC) 150 MG TABLET    Take 150 mg by mouth 2 times daily    SERTRALINE (ZOLOFT) 100 MG TABLET    TAKE 1 TABLET BY MOUTH 2 TIMES DAILY    TAMSULOSIN (FLOMAX) 0.4 MG CAPSULE    TAKE 1 CAPSULE BY MOUTH DAILY    TRAZODONE (DESYREL) 100 MG TABLET    Take 50 mg by mouth nightly as needed May repeat dose after 30 mins if first dose ineffective         ALLERGIES     Patient has no known allergies. FAMILY HISTORY       Family History   Problem Relation Age of Onset    Cancer Mother         brain and lung    Emphysema Father     Heart Disease Father           SOCIAL HISTORY       Social History     Social History    Marital status:      Spouse name: N/A    Number of children: N/A    Years of education: N/A     Social History Main Topics    Smoking status: Former Smoker     Packs/day: 1.00     Years: 20.00     Quit date: 1/1/1999    Smokeless tobacco: Never Used      Comment: smokes cigars, quit 11/28/12    Alcohol use Yes      Comment: RARE drink wine    Drug use: No    Sexual activity: Yes     Partners: Female     Other Topics Concern    None     Social History Narrative    None       SCREENINGS             PHYSICAL EXAM  (up to 7 for level 4, 8 or more for level 5)     ED Triage Vitals [11/21/18 1153]   BP Temp Temp Source Pulse Resp SpO2 Height Weight   (!) 161/72 97.9 °F (36.6 °C) Oral 67 19 97 % 6' 1\" (1.854 m) 244 lb (110.7 kg)       Physical Exam   Constitutional: No distress. HENT:   Head: Atraumatic. Eyes: Pupils are equal, round, and reactive to light. Right eye exhibits no discharge. Left eye exhibits no discharge. No scleral icterus. Neck: Neck supple. Cardiovascular: Normal rate, regular rhythm, normal heart sounds and intact distal pulses. Exam reveals no gallop and no friction rub. No murmur heard. Pulmonary/Chest: Effort normal and breath sounds normal. No stridor. No respiratory distress. He has no wheezes. He has no rales.  He

## 2018-11-21 NOTE — H&P
5.4 11/21/2018     11/21/2018    CO2 21 11/21/2018    BUN 50 11/21/2018    CREATININE 1.4 11/21/2018    CALCIUM 11.0 11/21/2018    GFRAA 60 11/21/2018    GFRAA >60 02/25/2013    LABGLOM 49 11/21/2018    LABGLOM 90 08/30/2012    LABGLOM 74 08/30/2012    GLUCOSE 97 11/21/2018       Chest Xray:   EKG:    I visualized CXR images and EKG strips     Discussed care with ED provider     Problem List  Active Problems:    * No active hospital problems. *  Resolved Problems:    * No resolved hospital problems. *        Assessment/Plan:   Chest pain, the patient with a history of CABG ×4, admit to telemetry, patient's troponin is mildly elevated at 0.05, trend troponin, cardiology consult. Appreciate recommendations. Hypertensive urgency,  blood pressure at the emergency department was very high, likely secondary to noncompliance with medication, patient wife did convince the patient to take his blood pressure medication, blood pressure did improve while I was in the room taking history from the patient, we'll continue with his home blood pressure medications. We'll add when necessary blood pressure medications for high levels. Suicidal ideation, admitted with , patient cannot leave the hospital, he is on hold for 24 hours and this should be continuously renewed until released by psychiatrist, psychiatric consult.  -Wife is concerned about a stroke, CT scan head ordered, which very hard to do neuro check every 4 hours and the patient does not want to cooperate, we'll try. History of A. fib, status post cardioversion and first-degree block/right bundle-branch block and dual-chamber AV pacemaker in 2010, the patient followed by cardiologist as an outpatient. History of renal cell carcinoma, status post right nephrectomy in 2008. DORINA, patient is on CPAP during the night, we'll order one for him.     Please notice that patient medications/psych medications list were taken from the EMS,

## 2018-11-22 LAB
ALBUMIN SERPL-MCNC: 3.7 G/DL (ref 3.4–5)
ANION GAP SERPL CALCULATED.3IONS-SCNC: 13 MMOL/L (ref 3–16)
BASOPHILS ABSOLUTE: 0.1 K/UL (ref 0–0.2)
BASOPHILS RELATIVE PERCENT: 0.7 %
BUN BLDV-MCNC: 42 MG/DL (ref 7–20)
CALCIUM SERPL-MCNC: 9.5 MG/DL (ref 8.3–10.6)
CHLORIDE BLD-SCNC: 106 MMOL/L (ref 99–110)
CO2: 24 MMOL/L (ref 21–32)
CREAT SERPL-MCNC: 1.3 MG/DL (ref 0.8–1.3)
EKG ATRIAL RATE: 62 BPM
EKG DIAGNOSIS: NORMAL
EKG P-R INTERVAL: 244 MS
EKG Q-T INTERVAL: 514 MS
EKG QRS DURATION: 142 MS
EKG QTC CALCULATION (BAZETT): 521 MS
EKG R AXIS: -53 DEGREES
EKG T AXIS: 4 DEGREES
EKG VENTRICULAR RATE: 62 BPM
EOSINOPHILS ABSOLUTE: 0.2 K/UL (ref 0–0.6)
EOSINOPHILS RELATIVE PERCENT: 2 %
GFR AFRICAN AMERICAN: >60
GFR NON-AFRICAN AMERICAN: 54
GLUCOSE BLD-MCNC: 109 MG/DL (ref 70–99)
GLUCOSE BLD-MCNC: 125 MG/DL (ref 70–99)
GLUCOSE BLD-MCNC: 126 MG/DL (ref 70–99)
HCT VFR BLD CALC: 47.8 % (ref 40.5–52.5)
HEMOGLOBIN: 15.2 G/DL (ref 13.5–17.5)
LYMPHOCYTES ABSOLUTE: 1.2 K/UL (ref 1–5.1)
LYMPHOCYTES RELATIVE PERCENT: 12.6 %
MAGNESIUM: 3 MG/DL (ref 1.8–2.4)
MCH RBC QN AUTO: 27.6 PG (ref 26–34)
MCHC RBC AUTO-ENTMCNC: 31.9 G/DL (ref 31–36)
MCV RBC AUTO: 86.7 FL (ref 80–100)
MONOCYTES ABSOLUTE: 1 K/UL (ref 0–1.3)
MONOCYTES RELATIVE PERCENT: 10.6 %
NEUTROPHILS ABSOLUTE: 7.2 K/UL (ref 1.7–7.7)
NEUTROPHILS RELATIVE PERCENT: 74.1 %
PDW BLD-RTO: 15.7 % (ref 12.4–15.4)
PERFORMED ON: ABNORMAL
PHOSPHORUS: 2.1 MG/DL (ref 2.5–4.9)
PLATELET # BLD: 198 K/UL (ref 135–450)
PMV BLD AUTO: 8.2 FL (ref 5–10.5)
POTASSIUM SERPL-SCNC: 3.8 MMOL/L (ref 3.5–5.1)
RBC # BLD: 5.51 M/UL (ref 4.2–5.9)
SODIUM BLD-SCNC: 143 MMOL/L (ref 136–145)
TROPONIN: 0.03 NG/ML
TROPONIN: 0.05 NG/ML
WBC # BLD: 9.7 K/UL (ref 4–11)

## 2018-11-22 PROCEDURE — 96367 TX/PROPH/DG ADDL SEQ IV INF: CPT

## 2018-11-22 PROCEDURE — 2700000000 HC OXYGEN THERAPY PER DAY

## 2018-11-22 PROCEDURE — 84484 ASSAY OF TROPONIN QUANT: CPT

## 2018-11-22 PROCEDURE — G0378 HOSPITAL OBSERVATION PER HR: HCPCS

## 2018-11-22 PROCEDURE — 83735 ASSAY OF MAGNESIUM: CPT

## 2018-11-22 PROCEDURE — 85025 COMPLETE CBC W/AUTO DIFF WBC: CPT

## 2018-11-22 PROCEDURE — 99205 OFFICE O/P NEW HI 60 MIN: CPT | Performed by: INTERNAL MEDICINE

## 2018-11-22 PROCEDURE — 94760 N-INVAS EAR/PLS OXIMETRY 1: CPT

## 2018-11-22 PROCEDURE — 93010 ELECTROCARDIOGRAM REPORT: CPT | Performed by: INTERNAL MEDICINE

## 2018-11-22 PROCEDURE — 36415 COLL VENOUS BLD VENIPUNCTURE: CPT

## 2018-11-22 PROCEDURE — 6370000000 HC RX 637 (ALT 250 FOR IP): Performed by: INTERNAL MEDICINE

## 2018-11-22 PROCEDURE — 2500000003 HC RX 250 WO HCPCS: Performed by: INTERNAL MEDICINE

## 2018-11-22 PROCEDURE — 96366 THER/PROPH/DIAG IV INF ADDON: CPT

## 2018-11-22 PROCEDURE — 96372 THER/PROPH/DIAG INJ SC/IM: CPT

## 2018-11-22 PROCEDURE — 6360000002 HC RX W HCPCS: Performed by: INTERNAL MEDICINE

## 2018-11-22 PROCEDURE — 2580000003 HC RX 258: Performed by: INTERNAL MEDICINE

## 2018-11-22 PROCEDURE — 80069 RENAL FUNCTION PANEL: CPT

## 2018-11-22 RX ADMIN — CLONIDINE HYDROCHLORIDE 0.2 MG: 0.1 TABLET ORAL at 09:22

## 2018-11-22 RX ADMIN — DESMOPRESSIN ACETATE 40 MG: 0.2 TABLET ORAL at 22:05

## 2018-11-22 RX ADMIN — ENOXAPARIN SODIUM 40 MG: 40 INJECTION SUBCUTANEOUS at 09:22

## 2018-11-22 RX ADMIN — Medication 10 ML: at 09:22

## 2018-11-22 RX ADMIN — OLANZAPINE 10 MG: 5 TABLET, FILM COATED ORAL at 22:05

## 2018-11-22 RX ADMIN — DEXTROSE AND SODIUM CHLORIDE: 5; 450 INJECTION, SOLUTION INTRAVENOUS at 09:20

## 2018-11-22 RX ADMIN — MAGNESIUM SULFATE HEPTAHYDRATE 1 G: 1 INJECTION, SOLUTION INTRAVENOUS at 00:35

## 2018-11-22 RX ADMIN — CLONIDINE HYDROCHLORIDE 0.2 MG: 0.1 TABLET ORAL at 13:53

## 2018-11-22 RX ADMIN — MAGNESIUM SULFATE HEPTAHYDRATE 1 G: 1 INJECTION, SOLUTION INTRAVENOUS at 02:37

## 2018-11-22 RX ADMIN — NIFEDIPINE 30 MG: 30 TABLET, FILM COATED, EXTENDED RELEASE ORAL at 09:22

## 2018-11-22 RX ADMIN — NITROGLYCERIN 1 INCH: 20 OINTMENT TOPICAL at 09:21

## 2018-11-22 RX ADMIN — ASPIRIN 81 MG 81 MG: 81 TABLET ORAL at 09:21

## 2018-11-22 RX ADMIN — DEXTROSE AND SODIUM CHLORIDE: 5; 450 INJECTION, SOLUTION INTRAVENOUS at 22:05

## 2018-11-22 RX ADMIN — MAGNESIUM SULFATE HEPTAHYDRATE 1 G: 1 INJECTION, SOLUTION INTRAVENOUS at 01:37

## 2018-11-22 RX ADMIN — CLONIDINE HYDROCHLORIDE 0.2 MG: 0.1 TABLET ORAL at 22:05

## 2018-11-22 RX ADMIN — POTASSIUM PHOSPHATE, MONOBASIC AND POTASSIUM PHOSPHATE, DIBASIC 30 MMOL: 224; 236 INJECTION, SOLUTION, CONCENTRATE INTRAVENOUS at 09:20

## 2018-11-22 RX ADMIN — SERTRALINE 100 MG: 50 TABLET, FILM COATED ORAL at 09:21

## 2018-11-22 ASSESSMENT — PAIN SCALES - GENERAL
PAINLEVEL_OUTOF10: 0

## 2018-11-22 NOTE — PLAN OF CARE
Problem: Suicide risk  Goal: Provide patient with safe environment  Provide patient with safe environment  Sitter in room with pt at all times unless nurse in the room and filling out safety log. Kathya Lucero RN

## 2018-11-22 NOTE — CONSULTS
dysfunction); Frontotemporal dementia with behavioral disturbance; GERD (gastroesophageal reflux disease); HBP (high blood pressure); Headache(784.0); History of kidney removal; Hyperlipidemia; Irritable bowel syndrome; MDD (major depressive disorder), recurrent, severe, with psychosis ([facility] PAF (paroxysmal atrial fibrillation) ([facility] Paranoia ([facility] RBBB (right bundle branch block); [facility] Se[facility] SSS (sick sinus syndrome) ([facility] Suicidal ideation; and Torn rotator cuff. Surgical History:   has a past surgical history that includes Kidney removal (10/27/08); Vasectomy; Appendectomy; Pacemaker insertion (12/20/10); Knee arthroscopy (10/2011); and Coronary artery bypass graft (3/2013). Social History:   reports that he quit smoking about 19 years ago. He has a 20.00 pack-year smoking history. He has never used smokeless tobacco. He reports that he drinks alcohol. He reports that he does not use drugs. Family History:  family history includes Cancer in his mother; Emphysema in his father; Heart Disease in his father. Home Medications:  Were reviewed and are listed in nursing record. and/or listed below  Prior to Admission medications    Medication Sig Start Date End Date Taking?  Authorizing Provider   Nutritional Supplements (BOOST HIGH PROTEIN PO) Take by mouth   Yes Historical Provider, MD   OLANZapine (ZYPREXA) 10 MG tablet Take 10 mg by mouth nightly   Yes Historical Provider, MD   QUEtiapine (SEROQUEL) 100 MG tablet Take 100 mg by mouth nightly    Yes Historical Provider, MD   aluminum & magnesium hydroxide-simethicone (MAALOX) 200-200-20 MG/5ML SUSP suspension Take 30 mLs by mouth every 6 hours as needed for Indigestion   Yes Historical Provider, MD   albuterol sulfate  (90 Base) MCG/ACT inhaler Inhale 2 puffs into the lungs every 6 hours as needed for Wheezing   Yes Historical Provider, MD   benzocaine-menthol (CEPACOL SORE THROAT) 15-3.6 MG lozenge Take 1 no rashes or lesions   Pysch: Not speaking   Neurologic: Normal gross motor and sensory exam.         Labs  CBC:   Lab Results   Component Value Date    WBC 9.7 11/22/2018    RBC 5.51 11/22/2018    HGB 15.2 11/22/2018    HCT 47.8 11/22/2018    MCV 86.7 11/22/2018    RDW 15.7 11/22/2018     11/22/2018     CMP:    Lab Results   Component Value Date     11/22/2018    K 3.8 11/22/2018     11/22/2018    CO2 24 11/22/2018    BUN 42 11/22/2018    CREATININE 1.3 11/22/2018    GFRAA >60 11/22/2018    GFRAA >60 02/25/2013    AGRATIO 1.8 05/26/2017    LABGLOM 54 11/22/2018    LABGLOM 90 08/30/2012    LABGLOM 74 08/30/2012    GLUCOSE 125 11/22/2018    PROT 6.8 11/21/2018    PROT 7.3 07/17/2012    CALCIUM 9.5 11/22/2018    BILITOT 0.6 11/21/2018    ALKPHOS 72 11/21/2018    AST 26 11/21/2018    ALT 20 11/21/2018     PT/INR:  No results found for: PTINR  Lab Results   Component Value Date    TROPONINI 0.05 (H) 11/22/2018       EKG:  I have reviewed EKG with the following interpretation:  Impression:  See HPI    6/18/12 GXT stress ECHO  Study Conclusions           - Stress ECG conclusions: There were no stress arrhythmias     or conduction abnormalities. The stress ECG was     non-diagnostic due to ventricular pacing at higher HR&apos;s     Stress echo: Echocardiographic findings were positive for     stress-induced ischemia. The mid anteroseptal wall showed     a new regional wall motion abnormality during stress,     indicating stress-induced ischemia. Impressions:   Abnormal study.         11/7/18 ECHO Study Conclusions  - Left ventricle: The cavity size was normal. There was mild    concentric hypertrophy. Systolic function was mildly reduced. The    calculated ejection fraction was in the range of 47% to 51%.    Doppler parameters are consistent with abnormal left ventricular    relaxation (grade 1 diastolic dysfunction).   - Ventricular septum: Septal motion showed paradoxical septal    motion consistent with a conduction abnormality or paced rhythm. - Aortic valve: Mean gradient (S): 5mm Hg. Peak gradient (S): 9mmHg.  - Right ventricle: The cavity size was mildly dilated. Wall    thickness was normal. Pacer wire noted in right ventricle. - Inferior vena cava: The vessel was normal in size; the    respirophasic diameter changes were in the normal range (>= 50%);    findings are consistent with normal central venous pressure. Assessment:  Vance Hitchcock is a 74 y.o. patient who presented to [facility] 11/21/18 with reported c/o chest pain. Note due mental health issues patient is not speaking and history obtained per chart and wife at bedside. Normally follows Dr. Jose Alfredo Ibarra and Stephania Lopez at [facility] (last OV 10/18). He has PMH of CAD s/p 4V CABG March 2013, s/p dual-chamber PMH 2010, hx PAF s/p cardioversion 2010 (note taken off eliquis in October due to no evidence for atrial arrhythmia x 3 years), hx renal cell CA s/p nephrectomy , GERD, HTN, HLD, seizure d/o, and major depression with psychosis and paranoia. Most recent ECHO 11/7/18 showed mildly decreased EF=47-51%; pacer lead. Prior 2012 GXT stress ECHO with anteroseptal wall abnormality. He was sent from [facility] due to c/o CP and suicidal ideation. Note I have no specifics regarding CP due to psychiatric issues and not speaking. Wife was told sitter at hospital told her he c/o precordial CP but vague and no way to determine duration, modifying factors, associated symptoms, radiation, etc. Apparently, did not take medications for 2-3 days. In ER noted to have marked HTN ranging from 532-740MMVN systolic. Note CT head negative for bleed and CXR with bibasilar atelectasis. Note Saadia 0.05, 0.04, 0.05 and WPZ=9070. Diagnosis of HTN urgency and unspecified chest pain possibly related to HTN. However, hx CABG prior. Mild Saadia elevation and could be related to renal insufficiency and/or demand ischemia,     Recs:  1.  Continue baby aspirin 81mg qd, lipitor 40mg

## 2018-11-22 NOTE — PLAN OF CARE
Problem: Falls - Risk of:  Goal: Will remain free from falls  Will remain free from falls   Patient free from harm. ID bands on, bed in lowest position, call light in reach. Patient instructed to call for help if needed. Patient educated on ambulation and safety.   Caity bourne RN

## 2018-11-23 LAB
ALBUMIN SERPL-MCNC: 3.2 G/DL (ref 3.4–5)
ANION GAP SERPL CALCULATED.3IONS-SCNC: 12 MMOL/L (ref 3–16)
BASOPHILS ABSOLUTE: 0.1 K/UL (ref 0–0.2)
BASOPHILS RELATIVE PERCENT: 1 %
BUN BLDV-MCNC: 36 MG/DL (ref 7–20)
CALCIUM SERPL-MCNC: 9.6 MG/DL (ref 8.3–10.6)
CHLORIDE BLD-SCNC: 108 MMOL/L (ref 99–110)
CO2: 21 MMOL/L (ref 21–32)
CREAT SERPL-MCNC: 1.2 MG/DL (ref 0.8–1.3)
EOSINOPHILS ABSOLUTE: 0.2 K/UL (ref 0–0.6)
EOSINOPHILS RELATIVE PERCENT: 2.5 %
GFR AFRICAN AMERICAN: >60
GFR NON-AFRICAN AMERICAN: 59
GLUCOSE BLD-MCNC: 100 MG/DL (ref 70–99)
GLUCOSE BLD-MCNC: 102 MG/DL (ref 70–99)
GLUCOSE BLD-MCNC: 108 MG/DL (ref 70–99)
GLUCOSE BLD-MCNC: 115 MG/DL (ref 70–99)
HCT VFR BLD CALC: 46.3 % (ref 40.5–52.5)
HEMOGLOBIN: 14.9 G/DL (ref 13.5–17.5)
LV EF: 65 %
LVEF MODALITY: NORMAL
LYMPHOCYTES ABSOLUTE: 1.3 K/UL (ref 1–5.1)
LYMPHOCYTES RELATIVE PERCENT: 17.7 %
MAGNESIUM: 2.4 MG/DL (ref 1.8–2.4)
MCH RBC QN AUTO: 28.3 PG (ref 26–34)
MCHC RBC AUTO-ENTMCNC: 32.3 G/DL (ref 31–36)
MCV RBC AUTO: 87.7 FL (ref 80–100)
MONOCYTES ABSOLUTE: 0.8 K/UL (ref 0–1.3)
MONOCYTES RELATIVE PERCENT: 10.6 %
NEUTROPHILS ABSOLUTE: 5.1 K/UL (ref 1.7–7.7)
NEUTROPHILS RELATIVE PERCENT: 68.2 %
PDW BLD-RTO: 15.4 % (ref 12.4–15.4)
PERFORMED ON: ABNORMAL
PHOSPHORUS: 3 MG/DL (ref 2.5–4.9)
PLATELET # BLD: 164 K/UL (ref 135–450)
PMV BLD AUTO: 8.5 FL (ref 5–10.5)
POTASSIUM SERPL-SCNC: 4 MMOL/L (ref 3.5–5.1)
RBC # BLD: 5.27 M/UL (ref 4.2–5.9)
SODIUM BLD-SCNC: 141 MMOL/L (ref 136–145)
WBC # BLD: 7.5 K/UL (ref 4–11)

## 2018-11-23 PROCEDURE — 2580000003 HC RX 258: Performed by: INTERNAL MEDICINE

## 2018-11-23 PROCEDURE — 6360000002 HC RX W HCPCS: Performed by: INTERNAL MEDICINE

## 2018-11-23 PROCEDURE — 36415 COLL VENOUS BLD VENIPUNCTURE: CPT

## 2018-11-23 PROCEDURE — A9502 TC99M TETROFOSMIN: HCPCS | Performed by: INTERNAL MEDICINE

## 2018-11-23 PROCEDURE — G0378 HOSPITAL OBSERVATION PER HR: HCPCS

## 2018-11-23 PROCEDURE — 93017 CV STRESS TEST TRACING ONLY: CPT | Performed by: INTERNAL MEDICINE

## 2018-11-23 PROCEDURE — 3430000000 HC RX DIAGNOSTIC RADIOPHARMACEUTICAL: Performed by: INTERNAL MEDICINE

## 2018-11-23 PROCEDURE — 96372 THER/PROPH/DIAG INJ SC/IM: CPT

## 2018-11-23 PROCEDURE — 6370000000 HC RX 637 (ALT 250 FOR IP): Performed by: INTERNAL MEDICINE

## 2018-11-23 PROCEDURE — 78452 HT MUSCLE IMAGE SPECT MULT: CPT | Performed by: INTERNAL MEDICINE

## 2018-11-23 PROCEDURE — 83735 ASSAY OF MAGNESIUM: CPT

## 2018-11-23 PROCEDURE — 80069 RENAL FUNCTION PANEL: CPT

## 2018-11-23 PROCEDURE — 99226 PR SBSQ OBSERVATION CARE/DAY 35 MINUTES: CPT | Performed by: INTERNAL MEDICINE

## 2018-11-23 PROCEDURE — 85025 COMPLETE CBC W/AUTO DIFF WBC: CPT

## 2018-11-23 RX ORDER — AMINOPHYLLINE DIHYDRATE 25 MG/ML
100 INJECTION, SOLUTION INTRAVENOUS ONCE
Status: COMPLETED | OUTPATIENT
Start: 2018-11-23 | End: 2018-11-23

## 2018-11-23 RX ORDER — ASPIRIN 81 MG/1
81 TABLET, CHEWABLE ORAL DAILY
Qty: 30 TABLET | Refills: 0 | COMMUNITY
Start: 2018-11-23 | End: 2022-04-25 | Stop reason: ALTCHOICE

## 2018-11-23 RX ADMIN — OLANZAPINE 10 MG: 5 TABLET, FILM COATED ORAL at 20:22

## 2018-11-23 RX ADMIN — ENOXAPARIN SODIUM 40 MG: 40 INJECTION SUBCUTANEOUS at 11:42

## 2018-11-23 RX ADMIN — CLONIDINE HYDROCHLORIDE 0.2 MG: 0.1 TABLET ORAL at 20:22

## 2018-11-23 RX ADMIN — ASPIRIN 81 MG 81 MG: 81 TABLET ORAL at 11:42

## 2018-11-23 RX ADMIN — AMINOPHYLLINE 100 MG: 25 INJECTION, SOLUTION INTRAVENOUS at 09:50

## 2018-11-23 RX ADMIN — TETROFOSMIN 30 MILLICURIE: 0.23 INJECTION, POWDER, LYOPHILIZED, FOR SOLUTION INTRAVENOUS at 10:00

## 2018-11-23 RX ADMIN — REGADENOSON 0.4 MG: 0.08 INJECTION, SOLUTION INTRAVENOUS at 09:41

## 2018-11-23 RX ADMIN — TETROFOSMIN 10 MILLICURIE: 0.23 INJECTION, POWDER, LYOPHILIZED, FOR SOLUTION INTRAVENOUS at 09:00

## 2018-11-23 RX ADMIN — DESMOPRESSIN ACETATE 40 MG: 0.2 TABLET ORAL at 20:23

## 2018-11-23 RX ADMIN — NIFEDIPINE 30 MG: 30 TABLET, FILM COATED, EXTENDED RELEASE ORAL at 11:43

## 2018-11-23 RX ADMIN — Medication 10 ML: at 11:41

## 2018-11-23 RX ADMIN — DEXTROSE AND SODIUM CHLORIDE: 5; 450 INJECTION, SOLUTION INTRAVENOUS at 18:34

## 2018-11-23 RX ADMIN — SERTRALINE 100 MG: 50 TABLET, FILM COATED ORAL at 11:42

## 2018-11-23 RX ADMIN — CLONIDINE HYDROCHLORIDE 0.2 MG: 0.1 TABLET ORAL at 15:08

## 2018-11-23 ASSESSMENT — PAIN SCALES - GENERAL
PAINLEVEL_OUTOF10: 0
PAINLEVEL_OUTOF10: 0

## 2018-11-23 NOTE — CARE COORDINATION
Per MD, If stress test is negative, patient may be discharged back to [facility].    A referral was called and faxed to Kong Trevizo, [phone], fax [phone] @ [facility] to return when medically ready, pending a  Psychiatry consult   MD, Please complete & sign the e-DEVON under the discharge instructions, AVS/Prescriptions, and or medical necessity note for assistance with discharge planning, Thank you, Eder Arroyo, MSNAS, [phone]

## 2018-11-24 LAB
ALBUMIN SERPL-MCNC: 3.3 G/DL (ref 3.4–5)
ANION GAP SERPL CALCULATED.3IONS-SCNC: 11 MMOL/L (ref 3–16)
BASOPHILS ABSOLUTE: 0.1 K/UL (ref 0–0.2)
BASOPHILS RELATIVE PERCENT: 1 %
BUN BLDV-MCNC: 28 MG/DL (ref 7–20)
CALCIUM SERPL-MCNC: 9.8 MG/DL (ref 8.3–10.6)
CHLORIDE BLD-SCNC: 108 MMOL/L (ref 99–110)
CO2: 23 MMOL/L (ref 21–32)
CREAT SERPL-MCNC: 1.1 MG/DL (ref 0.8–1.3)
EOSINOPHILS ABSOLUTE: 0.2 K/UL (ref 0–0.6)
EOSINOPHILS RELATIVE PERCENT: 2.5 %
GFR AFRICAN AMERICAN: >60
GFR NON-AFRICAN AMERICAN: >60
GLUCOSE BLD-MCNC: 102 MG/DL (ref 70–99)
GLUCOSE BLD-MCNC: 120 MG/DL (ref 70–99)
GLUCOSE BLD-MCNC: 122 MG/DL (ref 70–99)
GLUCOSE BLD-MCNC: 139 MG/DL (ref 70–99)
GLUCOSE BLD-MCNC: 95 MG/DL (ref 70–99)
HCT VFR BLD CALC: 45.8 % (ref 40.5–52.5)
HEMOGLOBIN: 14.8 G/DL (ref 13.5–17.5)
LYMPHOCYTES ABSOLUTE: 1.3 K/UL (ref 1–5.1)
LYMPHOCYTES RELATIVE PERCENT: 16.8 %
MAGNESIUM: 2 MG/DL (ref 1.8–2.4)
MCH RBC QN AUTO: 28.3 PG (ref 26–34)
MCHC RBC AUTO-ENTMCNC: 32.4 G/DL (ref 31–36)
MCV RBC AUTO: 87.3 FL (ref 80–100)
MONOCYTES ABSOLUTE: 0.7 K/UL (ref 0–1.3)
MONOCYTES RELATIVE PERCENT: 9.6 %
NEUTROPHILS ABSOLUTE: 5.4 K/UL (ref 1.7–7.7)
NEUTROPHILS RELATIVE PERCENT: 70.1 %
PDW BLD-RTO: 15.8 % (ref 12.4–15.4)
PERFORMED ON: ABNORMAL
PERFORMED ON: NORMAL
PHOSPHORUS: 2.6 MG/DL (ref 2.5–4.9)
PLATELET # BLD: 193 K/UL (ref 135–450)
PMV BLD AUTO: 8.6 FL (ref 5–10.5)
POTASSIUM SERPL-SCNC: 3.7 MMOL/L (ref 3.5–5.1)
RBC # BLD: 5.25 M/UL (ref 4.2–5.9)
SODIUM BLD-SCNC: 142 MMOL/L (ref 136–145)
WBC # BLD: 7.7 K/UL (ref 4–11)

## 2018-11-24 PROCEDURE — 6360000002 HC RX W HCPCS: Performed by: INTERNAL MEDICINE

## 2018-11-24 PROCEDURE — G0378 HOSPITAL OBSERVATION PER HR: HCPCS

## 2018-11-24 PROCEDURE — 80069 RENAL FUNCTION PANEL: CPT

## 2018-11-24 PROCEDURE — 2580000003 HC RX 258: Performed by: INTERNAL MEDICINE

## 2018-11-24 PROCEDURE — 96372 THER/PROPH/DIAG INJ SC/IM: CPT

## 2018-11-24 PROCEDURE — 99213 OFFICE O/P EST LOW 20 MIN: CPT | Performed by: CLINICAL NURSE SPECIALIST

## 2018-11-24 PROCEDURE — 6370000000 HC RX 637 (ALT 250 FOR IP): Performed by: INTERNAL MEDICINE

## 2018-11-24 PROCEDURE — 85025 COMPLETE CBC W/AUTO DIFF WBC: CPT

## 2018-11-24 PROCEDURE — 83735 ASSAY OF MAGNESIUM: CPT

## 2018-11-24 PROCEDURE — 36415 COLL VENOUS BLD VENIPUNCTURE: CPT

## 2018-11-24 RX ADMIN — ASPIRIN 81 MG 81 MG: 81 TABLET ORAL at 08:54

## 2018-11-24 RX ADMIN — DESMOPRESSIN ACETATE 40 MG: 0.2 TABLET ORAL at 22:05

## 2018-11-24 RX ADMIN — OLANZAPINE 10 MG: 5 TABLET, FILM COATED ORAL at 22:04

## 2018-11-24 RX ADMIN — CLONIDINE HYDROCHLORIDE 0.2 MG: 0.1 TABLET ORAL at 22:04

## 2018-11-24 RX ADMIN — SERTRALINE 100 MG: 50 TABLET, FILM COATED ORAL at 08:53

## 2018-11-24 RX ADMIN — TRAZODONE HYDROCHLORIDE 50 MG: 50 TABLET ORAL at 22:05

## 2018-11-24 RX ADMIN — ENOXAPARIN SODIUM 40 MG: 40 INJECTION SUBCUTANEOUS at 08:54

## 2018-11-24 RX ADMIN — CLONIDINE HYDROCHLORIDE 0.2 MG: 0.1 TABLET ORAL at 08:54

## 2018-11-24 RX ADMIN — NIFEDIPINE 30 MG: 30 TABLET, FILM COATED, EXTENDED RELEASE ORAL at 08:54

## 2018-11-24 RX ADMIN — DEXTROSE AND SODIUM CHLORIDE: 5; 450 INJECTION, SOLUTION INTRAVENOUS at 08:54

## 2018-11-24 RX ADMIN — HYDROXYZINE HYDROCHLORIDE 50 MG: 10 TABLET ORAL at 18:39

## 2018-11-24 ASSESSMENT — PAIN SCALES - GENERAL
PAINLEVEL_OUTOF10: 0

## 2018-11-24 NOTE — PLAN OF CARE
Problem: Falls - Risk of:  Goal: Will remain free from falls  Will remain free from falls   Outcome: Ongoing  Remains free from falls this shift. Problem: Pain:  Goal: Pain level will decrease  Pain level will decrease   Outcome: Ongoing  Denies pain this shift. Problem: Suicide risk  Goal: Provide patient with safe environment  Provide patient with safe environment   Outcome: Ongoing  Constant observer remains @bedside this shift for safety.

## 2018-11-25 VITALS
HEIGHT: 73 IN | SYSTOLIC BLOOD PRESSURE: 118 MMHG | BODY MASS INDEX: 30.03 KG/M2 | DIASTOLIC BLOOD PRESSURE: 71 MMHG | HEART RATE: 60 BPM | WEIGHT: 226.6 LBS | RESPIRATION RATE: 16 BRPM | TEMPERATURE: 97.2 F | OXYGEN SATURATION: 93 %

## 2018-11-25 PROBLEM — F32.3 MAJOR DEPRESSION WITH PSYCHOTIC FEATURES (HCC): Status: ACTIVE | Noted: 2018-11-25

## 2018-11-25 PROBLEM — R07.9 CHEST PAIN: Status: RESOLVED | Noted: 2018-11-21 | Resolved: 2018-11-25

## 2018-11-25 LAB
GLUCOSE BLD-MCNC: 113 MG/DL (ref 70–99)
GLUCOSE BLD-MCNC: 147 MG/DL (ref 70–99)
GLUCOSE BLD-MCNC: 97 MG/DL (ref 70–99)
PERFORMED ON: ABNORMAL
PERFORMED ON: ABNORMAL
PERFORMED ON: NORMAL

## 2018-11-25 PROCEDURE — 96372 THER/PROPH/DIAG INJ SC/IM: CPT

## 2018-11-25 PROCEDURE — G0378 HOSPITAL OBSERVATION PER HR: HCPCS

## 2018-11-25 PROCEDURE — 6370000000 HC RX 637 (ALT 250 FOR IP): Performed by: INTERNAL MEDICINE

## 2018-11-25 PROCEDURE — 99213 OFFICE O/P EST LOW 20 MIN: CPT | Performed by: CLINICAL NURSE SPECIALIST

## 2018-11-25 PROCEDURE — 6360000002 HC RX W HCPCS: Performed by: INTERNAL MEDICINE

## 2018-11-25 PROCEDURE — 2580000003 HC RX 258: Performed by: INTERNAL MEDICINE

## 2018-11-25 PROCEDURE — 96375 TX/PRO/DX INJ NEW DRUG ADDON: CPT

## 2018-11-25 RX ADMIN — CLONIDINE HYDROCHLORIDE 0.2 MG: 0.1 TABLET ORAL at 13:58

## 2018-11-25 RX ADMIN — ENOXAPARIN SODIUM 40 MG: 40 INJECTION SUBCUTANEOUS at 10:33

## 2018-11-25 RX ADMIN — NIFEDIPINE 30 MG: 30 TABLET, FILM COATED, EXTENDED RELEASE ORAL at 10:33

## 2018-11-25 RX ADMIN — ASPIRIN 81 MG 81 MG: 81 TABLET ORAL at 10:34

## 2018-11-25 RX ADMIN — HYDRALAZINE HYDROCHLORIDE 5 MG: 20 INJECTION INTRAMUSCULAR; INTRAVENOUS at 04:14

## 2018-11-25 RX ADMIN — CLONIDINE HYDROCHLORIDE 0.2 MG: 0.1 TABLET ORAL at 10:33

## 2018-11-25 RX ADMIN — SERTRALINE 100 MG: 50 TABLET, FILM COATED ORAL at 10:33

## 2018-11-25 RX ADMIN — DEXTROSE AND SODIUM CHLORIDE: 5; 450 INJECTION, SOLUTION INTRAVENOUS at 03:49

## 2018-11-25 ASSESSMENT — PAIN SCALES - GENERAL: PAINLEVEL_OUTOF10: 0

## 2018-11-25 NOTE — DISCHARGE SUMMARY
BUN 28 11/24/2018    CREATININE 1.1 11/24/2018    CALCIUM 9.8 11/24/2018    PHOS 2.6 11/24/2018         Significant Diagnostic Studies    Radiology:   Stress/Rest NM Myocardial SPECT - do not uncheck   Final Result      CT Head WO Contrast   Final Result   No acute intracranial abnormality. XR CHEST STANDARD (2 VW)   Final Result   1. Low lung volume exam.  Bibasilar atelectasis. 2. Stable mild cardiomegaly. Prior median sternotomy and CABG.                 Consults:     IP CONSULT TO HOSPITALIST  IP CONSULT TO PSYCHIATRY  IP CONSULT TO CARDIOLOGY  IP CONSULT TO PSYCHIATRY    Disposition:  [address] unit     Condition at Discharge: Stable    Discharge Instructions/Follow-up:    Patient will admitted to [facility] for ongoing psych issues  He will arrange for follow up with device clinic in 2 weeks    Code Status:  Full Code     Activity: activity as tolerated    Diet: regular diet      Discharge Medications:     Current Discharge Medication List           Details   aspirin 81 MG chewable tablet Take 1 tablet by mouth daily  Qty: 30 tablet, Refills: 0              Details   Nutritional Supplements (BOOST HIGH PROTEIN PO) Take by mouth      OLANZapine (ZYPREXA) 10 MG tablet Take 10 mg by mouth nightly      QUEtiapine (SEROQUEL) 100 MG tablet Take 100 mg by mouth nightly       aluminum & magnesium hydroxide-simethicone (MAALOX) 200-200-20 MG/5ML SUSP suspension Take 30 mLs by mouth every 6 hours as needed for Indigestion      albuterol sulfate  (90 Base) MCG/ACT inhaler Inhale 2 puffs into the lungs every 6 hours as needed for Wheezing      benzocaine-menthol (CEPACOL SORE THROAT) 15-3.6 MG lozenge Take 1 lozenge by mouth every 2 hours as needed for Sore Throat      benztropine mesylate (COGENTIN) 1 MG/ML injection Inject 1 mg into the muscle every 4 hours as needed      benztropine (COGENTIN) 1 MG tablet Take 1 mg by mouth every 4 hours as needed      calcium carbonate (TUMS) 500 MG

## 2018-11-25 NOTE — PLAN OF CARE
Problem: Falls - Risk of:  Goal: Will remain free from falls  Will remain free from falls   Outcome: Ongoing  No falls noted. Bed check on & in place. Bed in lowest position, wheels are locked. Reminded patient to use call light  for assistance. Patient verbalized understanding of instructions. Call light & personal items w/ in reach. Will continue to monitor. Problem: Pain:  Goal: Pain level will decrease  Pain level will decrease   Outcome: Ongoing  Pt denies pain. To monitor. Problem: Suicide risk  Goal: Provide patient with safe environment  Provide patient with safe environment   Outcome: Ongoing  Continue constant observer. Waiting on psych consult.

## 2018-11-26 LAB
BLOOD CULTURE, ROUTINE: NORMAL
CULTURE, BLOOD 2: NORMAL

## 2018-11-26 NOTE — PROGRESS NOTES
Admission and Assessment complete, see doc flowsheet. Patient resting in bed, call light in reach, bed in lowest position, brake set. Patient denies any needs at this time. Patient encouraged to call if needs arise.   Raleigh Magana RN
Aminophylline given per lexiscan protocol in stress lab for chest discomfort. Pt grimacing and rubbing chest with hand. Wife at bedside.
Discharged to [facility] per transport team's stretcher with personal belongings.
Hospitalist Progress Note      PCP: Mahin Herrera MD    Date of Admission: 11/21/2018    LOS: 0    Chief Complaint:   Chief Complaint   Patient presents with    Altered Mental Status     Pt sent from [facility] for evalution of change in [address] onset yesterday, pt does not speak much, but is now not speaking at all, not taking medications, not eating or drinking. Brought in by [facility]       Case Summary:   77-year-old gentleman with history of GERD, hypertension, seizure disorder, dyslipidemia, coronary artery disease status post 4 vessel CABG (March 2015), status post dual-chamber pacemaker, history of paroxysmal atrial fibrillation, history of renal cell carcinoma status post nephrectomy, major depression with psychosis and paranoia and not speaking due to mental issues and was inpatient at [facility] who was admitted to the hospital with chest pain on 11/21/2018. Stress test negative for ischemia. Active Hospital Problems    Diagnosis Date Noted    Pacemaker [Z95.0]     Atrial flutter ([address] [I48.92]     Hypertensive urgency [I16.0]     Elevated troponin [R74.8]     Coronary artery disease involving native heart with angina pectoris ([address] [I25.119]     Chest pain [R07.9] 11/21/2018     Chest pain is resolved and remains asymptomatic. Patient communicating minimally. Today he does not want to be disturbed. She awaits psychiatry consult    Assessment/Plan:  Active Problems:    Major depression with psychosis and paranoia: Not communicating much. Awaiting psychiatry consult in view of readmission to psychiatry unit    Chest pain/Elevated troponin: Negative stress test. OK for discharge from cardiology standpoint    Hypertensive urgency: This is significantly improved. And will continue on clonidine, nifedipine    Coronary artery disease involving native heart with angina pectoris [facility]): Asymptomatic no chest pains.   Continue on home aspirin, statin, and blood pressure
VSS, assessment as charted. Constant observer reports patient is very anxious, paranoid about someone trying to harm him and was wanting to leave. She talked him into going back to bed, but he remains anxious, talking about us putting him out on the street and worried about what will happen to him. Reassured he is staying in the hospital tonight and he is perfectly safe here, will have constant observer at his bedside 24/7 and the nurses will be in and out throughout the night and day. Meds administered including trazodone for sleep, only pain he reports is in his left foot from a \"callous\". Encouraged to rest, no other needs voiced.
SpO2 94%   BMI 32.19 kg/m²   SPO2 (COPD values may differ): Greater than or equal to 92% on room air = 0    Peak Flow (asthma only): not applicable = 0    RSI: 0-4 = See once and convert to home regimen or discontinue        Plan       Goals: volume expansion  Plan of Care: no changes patient was ordered on home regimen    Patient/caregiver was educated on the proper method of use of Respiratory Therapy device:  No: na      Level of understanding, patient was able to:(check appropriate box(es))   [] Verbalize understanding   [] Demonstrate understanding       [] Teach back        [] Needs reinforcement       []  No available caregiver               []  Other:     Response to education:  na     Teaching Time:  0  minutes      Is patient being placed on Home Treatment Regimen? Yes     Electronically signed by OMAR DANIEL RCP on 11/22/2018 at 3:24 AM    Respiratory Protocol Guidelines     1. Assessment and treatment by Respiratory Therapy will be initiated for medication and therapeutic interventions upon initiation of aerosolized medication. 2. Physician will be contacted for respiratory rate (RR) greater than 35 breaths per minute. Therapy will be held for heart rate (HR) greater than 140 beats per minute, pending direction from physician. 3. Bronchodilators will be administered via Metered Dose Inhaler (MDI) with spacer when the following criteria are met:  a. Alert and cooperative     b. HR < 140 bpm  c. RR < 30 bpm                d. Can demonstrate a 2-3 second inspiratory hold  4. Bronchodilators will be administered via Hand Held Nebulizer [facility]) to patients when ANY of the following criteria are met  a. Incognizant or uncooperative          b. Patients treated with HHN at Home        c. Unable to demonstrate proper MDI or HFA technique     d. RR > 30 bpm   5.  Bronchodilators will be delivered via Metered Dose Inhaler (MDI) or Hydrofluoroalkane (HFA) with spacer to intubated patients on mechanical
deficits  Psychological: Normal mood and affect      Medications:    atorvastatin  40 mg Oral Nightly    cloNIDine  0.2 mg Oral TID    NIFEdipine  30 mg Oral Daily    OLANZapine  10 mg Oral Nightly    sertraline  100 mg Oral Daily    sodium chloride flush  10 mL Intravenous 2 times per day    aspirin  81 mg Oral Daily    enoxaparin  40 mg Subcutaneous Daily    nitroglycerin  1 inch Topical 4 times per day      dextrose 5 % and 0.45 % NaCl 60 mL/hr at 11/25/18 0349    dextrose         Lab Data:  CBC:   Recent Labs      11/23/18   0457  11/24/18   0546   WBC  7.5  7.7   HGB  14.9  14.8   PLT  164  193     BMP:    Recent Labs      11/23/18   0457  11/24/18   0546   NA  141  142   K  4.0  3.7   CO2  21  23   BUN  36*  28*   CREATININE  1.2  1.1     INR:  No results for input(s): INR in the last 72 hours. BNP:    No results for input(s): PROBNP in the last 72 hours. Diagnostics:  Stress test 11/23/18  There is no evidence of stress induced ischemia. Hyperdynamic LV systolic    function with EF>65% with uniform wall motion. Low risk study        11/7/18 ECHO Study Conclusions  - Left ventricle: The cavity size was normal. There was mild    concentric hypertrophy. Systolic function was mildly reduced. The    calculated ejection fraction was in the range of 47% to 51%.    Doppler parameters are consistent with abnormal left ventricular    relaxation (grade 1 diastolic dysfunction). - Ventricular septum: Septal motion showed paradoxical septal    motion consistent with a conduction abnormality or paced rhythm. - Aortic valve: Mean gradient (S): 5mm Hg. Peak gradient (S): 9mmHg.  - Right ventricle: The cavity size was mildly dilated. Wall    thickness was normal. Pacer wire noted in right ventricle. - Inferior vena cava:  The vessel was normal in size; the    respirophasic diameter changes were in the normal range (>= 50%);    findings are consistent with normal central venous
stress test negative  2. Hypertension urgency, improved  3. Atrial flutter, rate controlled, on eliquis  4. Permanent pacemaker    Plan:    1. Continue baby aspirin 81 mg daily, nifedipine 30 mg daily, and clonidine 0.2 mg tid  2. Need interrogation as an outpatient and follow with his cardiologist at discharge    Psych consult pending. OK for discharge from cardiology standpoint    Discussed with patient who is agreeable with plan of care. Thank you for allowing me to participate in the care of your patient.     Eli Jason, CNS

## 2018-12-18 ENCOUNTER — APPOINTMENT (OUTPATIENT)
Dept: GENERAL RADIOLOGY | Age: 73
End: 2018-12-18
Payer: MEDICARE

## 2018-12-18 ENCOUNTER — HOSPITAL ENCOUNTER (EMERGENCY)
Age: 73
Discharge: NURSING, REHAB OR LONG-TERM CARE | End: 2018-12-19
Attending: EMERGENCY MEDICINE
Payer: MEDICARE

## 2018-12-18 DIAGNOSIS — R79.89 ELEVATED SERUM CREATININE: Primary | ICD-10-CM

## 2018-12-18 LAB
A/G RATIO: 1.2 (ref 1.1–2.2)
ALBUMIN SERPL-MCNC: 3.6 G/DL (ref 3.4–5)
ALP BLD-CCNC: 83 U/L (ref 40–129)
ALT SERPL-CCNC: 21 U/L (ref 10–40)
ANION GAP SERPL CALCULATED.3IONS-SCNC: 12 MMOL/L (ref 3–16)
AST SERPL-CCNC: 29 U/L (ref 15–37)
BASOPHILS ABSOLUTE: 0.1 K/UL (ref 0–0.2)
BASOPHILS RELATIVE PERCENT: 1.3 %
BILIRUB SERPL-MCNC: 0.6 MG/DL (ref 0–1)
BUN BLDV-MCNC: 43 MG/DL (ref 7–20)
CALCIUM SERPL-MCNC: 9.9 MG/DL (ref 8.3–10.6)
CHLORIDE BLD-SCNC: 105 MMOL/L (ref 99–110)
CO2: 25 MMOL/L (ref 21–32)
CREAT SERPL-MCNC: 1.4 MG/DL (ref 0.8–1.3)
EOSINOPHILS ABSOLUTE: 0.2 K/UL (ref 0–0.6)
EOSINOPHILS RELATIVE PERCENT: 3.4 %
GFR AFRICAN AMERICAN: 60
GFR NON-AFRICAN AMERICAN: 49
GLOBULIN: 3 G/DL
GLUCOSE BLD-MCNC: 102 MG/DL (ref 70–99)
GLUCOSE BLD-MCNC: 67 MG/DL (ref 70–99)
HCT VFR BLD CALC: 41.6 % (ref 40.5–52.5)
HEMOGLOBIN: 13.5 G/DL (ref 13.5–17.5)
INR BLD: 1.18 (ref 0.86–1.14)
LYMPHOCYTES ABSOLUTE: 0.9 K/UL (ref 1–5.1)
LYMPHOCYTES RELATIVE PERCENT: 15.3 %
MCH RBC QN AUTO: 27.2 PG (ref 26–34)
MCHC RBC AUTO-ENTMCNC: 32.4 G/DL (ref 31–36)
MCV RBC AUTO: 84 FL (ref 80–100)
MONOCYTES ABSOLUTE: 0.5 K/UL (ref 0–1.3)
MONOCYTES RELATIVE PERCENT: 8.8 %
NEUTROPHILS ABSOLUTE: 4.2 K/UL (ref 1.7–7.7)
NEUTROPHILS RELATIVE PERCENT: 71.2 %
PDW BLD-RTO: 16.1 % (ref 12.4–15.4)
PERFORMED ON: ABNORMAL
PLATELET # BLD: 196 K/UL (ref 135–450)
PMV BLD AUTO: 7.6 FL (ref 5–10.5)
POTASSIUM REFLEX MAGNESIUM: 4.3 MMOL/L (ref 3.5–5.1)
PRO-BNP: 2532 PG/ML (ref 0–449)
PROTHROMBIN TIME: 13.4 SEC (ref 9.8–13)
RBC # BLD: 4.95 M/UL (ref 4.2–5.9)
SODIUM BLD-SCNC: 142 MMOL/L (ref 136–145)
TOTAL PROTEIN: 6.6 G/DL (ref 6.4–8.2)
TROPONIN: 0.05 NG/ML
WBC # BLD: 5.9 K/UL (ref 4–11)

## 2018-12-18 PROCEDURE — 93005 ELECTROCARDIOGRAM TRACING: CPT | Performed by: EMERGENCY MEDICINE

## 2018-12-18 PROCEDURE — 2580000003 HC RX 258: Performed by: EMERGENCY MEDICINE

## 2018-12-18 PROCEDURE — 83880 ASSAY OF NATRIURETIC PEPTIDE: CPT

## 2018-12-18 PROCEDURE — 71045 X-RAY EXAM CHEST 1 VIEW: CPT

## 2018-12-18 PROCEDURE — 85610 PROTHROMBIN TIME: CPT

## 2018-12-18 PROCEDURE — 4500000002 HC ER NO CHARGE

## 2018-12-18 PROCEDURE — 84484 ASSAY OF TROPONIN QUANT: CPT

## 2018-12-18 PROCEDURE — 85025 COMPLETE CBC W/AUTO DIFF WBC: CPT

## 2018-12-18 PROCEDURE — 80053 COMPREHEN METABOLIC PANEL: CPT

## 2018-12-18 PROCEDURE — 36415 COLL VENOUS BLD VENIPUNCTURE: CPT

## 2018-12-18 RX ORDER — 0.9 % SODIUM CHLORIDE 0.9 %
1000 INTRAVENOUS SOLUTION INTRAVENOUS ONCE
Status: COMPLETED | OUTPATIENT
Start: 2018-12-18 | End: 2018-12-19

## 2018-12-18 RX ADMIN — SODIUM CHLORIDE 1000 ML: 9 INJECTION, SOLUTION INTRAVENOUS at 23:28

## 2018-12-19 VITALS
TEMPERATURE: 98 F | HEIGHT: 73 IN | SYSTOLIC BLOOD PRESSURE: 127 MMHG | WEIGHT: 226 LBS | HEART RATE: 60 BPM | DIASTOLIC BLOOD PRESSURE: 56 MMHG | OXYGEN SATURATION: 98 % | RESPIRATION RATE: 18 BRPM | BODY MASS INDEX: 29.95 KG/M2

## 2018-12-19 LAB
EKG ATRIAL RATE: 288 BPM
EKG DIAGNOSIS: NORMAL
EKG Q-T INTERVAL: 548 MS
EKG QRS DURATION: 190 MS
EKG QTC CALCULATION (BAZETT): 565 MS
EKG R AXIS: -35 DEGREES
EKG T AXIS: 108 DEGREES
EKG VENTRICULAR RATE: 64 BPM

## 2018-12-19 PROCEDURE — 93010 ELECTROCARDIOGRAM REPORT: CPT | Performed by: INTERNAL MEDICINE

## 2018-12-19 NOTE — ED NOTES
Bed: 22  Expected date:   Expected time:   Means of arrival: [facility]  Comments:  Murray Dumont, RN  12/18/18 2040

## 2018-12-19 NOTE — ED NOTES
Report called and given to [facility] CORY BURGESS @[phone].  No questions at this time     Tye Hogan RN  12/19/18 1635

## 2018-12-19 NOTE — ED PROVIDER NOTES
 Headache(784.0)     History of kidney removal 10.27.2008    Hyperlipidemia     Irritable bowel syndrome     MDD (major depressive disorder), recurrent, severe, with psychosis ([facility] 8/19/15    PAF (paroxysmal atrial fibrillation) ([facility]     Paranoia ([facility] 8/19/15    RBBB (right bundle branch block) 2005    chronic    Renal cancer ([facility] 10/2008    Right     Seizure disorder ([facility] 3/20/2010    SSS (sick sinus syndrome) ([facility]     pacemaker    Suicidal ideation 8/19/15    Torn rotator cuff     right         SURGICALHISTORY       Past Surgical History:   Procedure Laterality Date    APPENDECTOMY      CORONARY ARTERY BYPASS GRAFT  3/2013    quadruple     KIDNEY REMOVAL  10/27/08    right     KNEE ARTHROSCOPY  10/2011    left    PACEMAKER INSERTION  12/20/10    VASECTOMY           CURRENT MEDICATIONS       Discharge Medication List as of 12/19/2018  2:06 AM      CONTINUE these medications which have NOT CHANGED    Details   aspirin 81 MG chewable tablet Take 1 tablet by mouth daily, Disp-30 tablet, R-0OTC      Nutritional Supplements (BOOST HIGH PROTEIN PO) Take by mouthHistorical Med      OLANZapine (ZYPREXA) 10 MG tablet Take 10 mg by mouth nightlyHistorical Med      QUEtiapine (SEROQUEL) 100 MG tablet Take 100 mg by mouth nightly Historical Med      aluminum & magnesium hydroxide-simethicone (MAALOX) 200-200-20 MG/5ML SUSP suspension Take 30 mLs by mouth every 6 hours as needed for IndigestionHistorical Med      albuterol sulfate  (90 Base) MCG/ACT inhaler Inhale 2 puffs into the lungs every 6 hours as needed for WheezingHistorical Med      benzocaine-menthol (CEPACOL SORE THROAT) 15-3.6 MG lozenge Take 1 lozenge by mouth every 2 hours as needed for Sore ThroatHistorical Med      benztropine mesylate (COGENTIN) 1 MG/ML injection Inject 1 mg into the muscle every 4 hours as neededHistorical Med      benztropine (COGENTIN) 1 MG tablet Take 1 mg by mouth every 4 hours as neededHistorical Med calcium carbonate (TUMS) 500 MG chewable tablet Take 1 tablet by mouth every 4 hours as needed for HeartburnHistorical Med      cloNIDine (CATAPRES) 0.2 MG tablet Take 0.2 mg by mouth every 8 hours as neededHistorical Med      haloperidol lactate (HALDOL) 5 MG/ML injection Inject 5 mg into the muscle every 4 hours as needed for AgitationHistorical Med      haloperidol (HALDOL) 5 MG tablet Take 5 mg by mouth every 4 hours as needed for AgitationHistorical Med      ibuprofen (ADVIL;MOTRIN) 400 MG tablet Take 400 mg by mouth every 6 hours as needed for PainHistorical Med      loperamide (IMODIUM) 2 MG capsule Take 4 mg by mouth as needed for DiarrheaHistorical Med      LORazepam (ATIVAN) 2 MG/ML injection Infuse 1 mg intravenously every 4 hours as needed. Santy Seaman Historical Med      ondansetron (ZOFRAN) 4 MG/2ML injection Infuse 4 mg intravenously every 4 hours as needed for Nausea or VomitingHistorical Med      ondansetron (ZOFRAN-ODT) 4 MG disintegrating tablet Take 4 mg by mouth every 4 hours as needed for Nausea or VomitingHistorical Med      sertraline (ZOLOFT) 100 MG tablet Take 200 mg by mouth dailyHistorical Med      hydrOXYzine (VISTARIL) 50 MG capsule Take 50 mg by mouth every 6 hours as needed for ItchingHistorical Med      LORazepam (ATIVAN) 1 MG tablet Take 1 mg by mouth every 4 hours as needed for Anxiety. Santy Seaman Historical Med      NIFEdipine (PROCARDIA XL) 30 MG extended release tablet TAKE 1 TABLET BY MOUTH DAILY, Disp-90 tablet, R-3Normal      tamsulosin (FLOMAX) 0.4 MG capsule TAKE 1 CAPSULE BY MOUTH DAILY, Disp-90 capsule, R-3Normal      atorvastatin (LIPITOR) 40 MG tablet TAKE ONE TABLET BY MOUTH EVERY EVENING, Disp-90 tablet, R-1Normal      oxybutynin (DITROPAN) 5 MG tablet Take 1 tablet by mouth 3 times daily, Disp-270 tablet, R-3Normal      ranitidine (ZANTAC) 150 MG tablet Take 150 mg by mouth 2 times dailyHistorical Med      traZODone (DESYREL) 100 MG tablet Take 50 mg by mouth nightly as needed May repeat

## 2018-12-19 NOTE — ED NOTES
Report given to [facility]. Pt transferred back to [facility].      More Clark RN  12/19/18 7962

## 2022-04-25 RX ORDER — MELATONIN 10 MG
10 CAPSULE ORAL NIGHTLY
COMMUNITY

## 2022-04-25 RX ORDER — MIRTAZAPINE 15 MG/1
15 TABLET, FILM COATED ORAL NIGHTLY
COMMUNITY

## 2022-04-25 RX ORDER — OLANZAPINE 5 MG/1
5 TABLET ORAL 2 TIMES DAILY
COMMUNITY

## 2022-04-25 RX ORDER — LISINOPRIL 5 MG/1
5 TABLET ORAL NIGHTLY
COMMUNITY

## 2022-04-25 RX ORDER — BUSPIRONE HYDROCHLORIDE 15 MG/1
15 TABLET ORAL 3 TIMES DAILY
COMMUNITY

## 2022-04-25 RX ORDER — MULTIVIT WITH MINERALS/LUTEIN
1000 TABLET ORAL DAILY
COMMUNITY

## 2022-04-25 RX ORDER — FAMOTIDINE 20 MG/1
20 TABLET, FILM COATED ORAL 2 TIMES DAILY
COMMUNITY

## 2022-04-25 RX ORDER — FINASTERIDE 5 MG/1
5 TABLET, FILM COATED ORAL
COMMUNITY
Start: 2015-09-04

## 2022-04-25 NOTE — PROGRESS NOTES
[facility] time__0820__________        Surgery time__0950__________    Take the following medications with a sip of water: Follow your MD/Surgeons pre-procedure instructions regarding your medications     Do not eat or drink anything after 12:00 midnight prior to your surgery. This includes water chewing gum, mints and ice chips. You may brush your teeth and gargle the morning of your surgery, but do not swallow the water     Please see your family doctor/pediatrician for a history and physical and/or concerning medications. H&P 4/20/22 Nidhi Jeong    Bring any test results/reports from your physicians office. If you are under the care of a heart doctor or specialist doctor, please be aware that you may be asked to them for clearance    You may be asked to stop blood thinners such as Coumadin, Plavix, Fragmin, Lovenox, etc., or any anti-inflammatories such as:  Aspirin, Ibuprofen, Advil, Naproxen prior to your surgery. We also ask that you stop any OTC medications such as fish oil, vitamin E, glucosamine, garlic, Multivitamins, COQ 10, etc.    We ask that you do not smoke 24 hours prior to surgery  We ask that you do not  drink any alcoholic beverages 24 hours prior to surgery     You must make arrangements for a responsible adult to take you home after your surgery. For your safety you will not be allowed to leave alone or drive yourself home. Your surgery will be cancelled if you do not have a ride home. Also for your safety, it is strongly suggested that someone stay with you the first 24 hours after your surgery. A parent or legal guardian must accompany a child scheduled for surgery and plan to stay at the hospital until the child is discharged. Please do not bring other children with you. For your comfort, please wear simple loose fitting clothing to the hospital.  Please do not bring valuables.  Wear short sleeve button down shirt or loose shirt and bring eye drops or eye ointment. Do not wear any make-up or nail polish on your fingers or toes      For your safety, please do not wear any jewelry or body piercing's on the day of surgery. All jewelry must be removed. If you have dentures, they will be removed before going to operating room. For your convenience, we will provide you with a container. If you wear contact lenses or glasses, they will be removed, please bring a case for them. If you have a living will and a durable power of  for healthcare, please bring in a copy. As part of our patient safety program to minimize surgical site infections, we ask you to do the following:    · Please notify your surgeon if you develop any illness between         now and the  day of your surgery. · This includes a cough, cold, fever, sore throat, nausea,         or vomiting, and diarrhea, etc.  ·  Please notify your surgeon if you experience dizziness, shortness         of breath or blurred vision between now and the time of your surgery. Do not shave your operative site 96 hours prior to surgery. For face and neck surgery, men may use an electric razor 48 hours   prior to surgery. You may shower the night before surgery or the morning of   your surgery with an antibacterial soap. You will need to bring a photo ID and insurance card    [facility] has an onsite pharmacy, would you like to utilize our pharmacy     If you will be staying overnight and use a C-pap machine, please bring   your C-pap to hospital     Our goal is to provide you with excellent care, therefore, visitors will be limited to two(2) in the room at a time so that we may focus on providing this care for you. Please contact pre-admission testing if you have any further questions.                  [facility] phone number:  [phone]  Please note these are generalized instructions for all surgical cases, you may be provided with more specific instructions according to your surgery. C-Difficile admission screening and protocol:       * Admitted with diarrhea? [] YES    [x]  NO     *Prior history of C-Diff. In last 3 months? [] YES    [x]  NO     *Antibiotic use in the past 6-8 weeks? [x]  NO    []  YES                 If yes, which ANTIBIOTIC AND REASON______     *Prior hospitalization or nursing home in the last month? []  YES    [x]  NO        SAFETY FIRST. .call before you fall

## 2022-04-26 ENCOUNTER — ANESTHESIA EVENT (OUTPATIENT)
Dept: SURGERY | Age: 77
End: 2022-04-26
Payer: MEDICARE

## 2022-04-26 NOTE — PROGRESS NOTES
Dr. Linda Kennedy reviewed the patient's medical conditions the patient has a  Pacemaker, cardiomyopathy CAD. The patient is stable. Dr. Linda Kennedy states it is okay for the patient to have his surgery here at the surgery center.

## 2022-04-27 ENCOUNTER — PREP FOR PROCEDURE (OUTPATIENT)
Dept: OPHTHALMOLOGY | Age: 77
End: 2022-04-27

## 2022-04-27 RX ORDER — SODIUM CHLORIDE 9 MG/ML
INJECTION, SOLUTION INTRAVENOUS PRN
Status: CANCELLED | OUTPATIENT
Start: 2022-04-27

## 2022-04-27 RX ORDER — KETOROLAC TROMETHAMINE 5 MG/ML
1 SOLUTION OPHTHALMIC SEE ADMIN INSTRUCTIONS
Status: CANCELLED | OUTPATIENT
Start: 2022-04-27

## 2022-04-27 RX ORDER — SODIUM CHLORIDE 0.9 % (FLUSH) 0.9 %
5-40 SYRINGE (ML) INJECTION PRN
Status: CANCELLED | OUTPATIENT
Start: 2022-04-27

## 2022-04-27 RX ORDER — PHENYLEPHRINE HCL 2.5 %
1 DROPS OPHTHALMIC (EYE) SEE ADMIN INSTRUCTIONS
Status: CANCELLED | OUTPATIENT
Start: 2022-04-27

## 2022-04-27 RX ORDER — TETRACAINE HYDROCHLORIDE 5 MG/ML
1 SOLUTION OPHTHALMIC SEE ADMIN INSTRUCTIONS
Status: CANCELLED | OUTPATIENT
Start: 2022-04-27

## 2022-04-27 RX ORDER — CIPROFLOXACIN HYDROCHLORIDE 3.5 MG/ML
1 SOLUTION/ DROPS TOPICAL SEE ADMIN INSTRUCTIONS
Status: CANCELLED | OUTPATIENT
Start: 2022-04-27

## 2022-04-27 RX ORDER — TROPICAMIDE 10 MG/ML
1 SOLUTION/ DROPS OPHTHALMIC SEE ADMIN INSTRUCTIONS
Status: CANCELLED | OUTPATIENT
Start: 2022-04-27

## 2022-04-27 RX ORDER — SODIUM CHLORIDE 0.9 % (FLUSH) 0.9 %
5-40 SYRINGE (ML) INJECTION EVERY 12 HOURS SCHEDULED
Status: CANCELLED | OUTPATIENT
Start: 2022-04-27

## 2022-04-29 ENCOUNTER — ANESTHESIA (OUTPATIENT)
Dept: SURGERY | Age: 77
End: 2022-04-29
Payer: MEDICARE

## 2022-04-29 ENCOUNTER — HOSPITAL ENCOUNTER (OUTPATIENT)
Age: 77
Setting detail: OUTPATIENT SURGERY
Discharge: HOME HEALTH | End: 2022-04-29
Attending: OPHTHALMOLOGY | Admitting: OPHTHALMOLOGY
Payer: MEDICARE

## 2022-04-29 VITALS
DIASTOLIC BLOOD PRESSURE: 65 MMHG | TEMPERATURE: 96.7 F | SYSTOLIC BLOOD PRESSURE: 131 MMHG | OXYGEN SATURATION: 94 % | HEART RATE: 70 BPM | BODY MASS INDEX: 33.13 KG/M2 | RESPIRATION RATE: 14 BRPM | WEIGHT: 250 LBS | HEIGHT: 73 IN

## 2022-04-29 VITALS — OXYGEN SATURATION: 98 % | DIASTOLIC BLOOD PRESSURE: 64 MMHG | SYSTOLIC BLOOD PRESSURE: 125 MMHG

## 2022-04-29 PROCEDURE — 3600000014 HC SURGERY LEVEL 4 ADDTL 15MIN: Performed by: OPHTHALMOLOGY

## 2022-04-29 PROCEDURE — 3700000001 HC ADD 15 MINUTES (ANESTHESIA): Performed by: OPHTHALMOLOGY

## 2022-04-29 PROCEDURE — V2632 POST CHMBR INTRAOCULAR LENS: HCPCS | Performed by: OPHTHALMOLOGY

## 2022-04-29 PROCEDURE — 7100000010 HC PHASE II RECOVERY - FIRST 15 MIN: Performed by: OPHTHALMOLOGY

## 2022-04-29 PROCEDURE — 6360000002 HC RX W HCPCS: Performed by: NURSE ANESTHETIST, CERTIFIED REGISTERED

## 2022-04-29 PROCEDURE — 6370000000 HC RX 637 (ALT 250 FOR IP): Performed by: OPHTHALMOLOGY

## 2022-04-29 PROCEDURE — 2709999900 HC NON-CHARGEABLE SUPPLY: Performed by: OPHTHALMOLOGY

## 2022-04-29 PROCEDURE — 3700000000 HC ANESTHESIA ATTENDED CARE: Performed by: OPHTHALMOLOGY

## 2022-04-29 PROCEDURE — 2500000003 HC RX 250 WO HCPCS: Performed by: OPHTHALMOLOGY

## 2022-04-29 PROCEDURE — 2580000003 HC RX 258: Performed by: STUDENT IN AN ORGANIZED HEALTH CARE EDUCATION/TRAINING PROGRAM

## 2022-04-29 PROCEDURE — 3600000004 HC SURGERY LEVEL 4 BASE: Performed by: OPHTHALMOLOGY

## 2022-04-29 DEVICE — LENS INTOCU +21.0 DIOPT L13MM DIA6MM 0DEG HAPTIC ANG A: Type: IMPLANTABLE DEVICE | Site: EYE | Status: FUNCTIONAL

## 2022-04-29 RX ORDER — TETRACAINE HYDROCHLORIDE 5 MG/ML
SOLUTION OPHTHALMIC
Status: COMPLETED | OUTPATIENT
Start: 2022-04-29 | End: 2022-04-29

## 2022-04-29 RX ORDER — SODIUM CHLORIDE 0.9 % (FLUSH) 0.9 %
5-40 SYRINGE (ML) INJECTION EVERY 12 HOURS SCHEDULED
Status: DISCONTINUED | OUTPATIENT
Start: 2022-04-29 | End: 2022-04-29 | Stop reason: HOSPADM

## 2022-04-29 RX ORDER — PHENYLEPHRINE HCL 2.5 %
1 DROPS OPHTHALMIC (EYE) SEE ADMIN INSTRUCTIONS
Status: DISCONTINUED | OUTPATIENT
Start: 2022-04-29 | End: 2022-04-29 | Stop reason: HOSPADM

## 2022-04-29 RX ORDER — SODIUM CHLORIDE 0.9 % (FLUSH) 0.9 %
5-40 SYRINGE (ML) INJECTION PRN
Status: DISCONTINUED | OUTPATIENT
Start: 2022-04-29 | End: 2022-04-29 | Stop reason: SDUPTHER

## 2022-04-29 RX ORDER — SODIUM CHLORIDE 9 MG/ML
INJECTION, SOLUTION INTRAVENOUS PRN
Status: DISCONTINUED | OUTPATIENT
Start: 2022-04-29 | End: 2022-04-29 | Stop reason: HOSPADM

## 2022-04-29 RX ORDER — KETOROLAC TROMETHAMINE 5 MG/ML
1 SOLUTION OPHTHALMIC SEE ADMIN INSTRUCTIONS
Status: COMPLETED | OUTPATIENT
Start: 2022-04-29 | End: 2022-04-29

## 2022-04-29 RX ORDER — TETRACAINE HYDROCHLORIDE 5 MG/ML
1 SOLUTION OPHTHALMIC SEE ADMIN INSTRUCTIONS
Status: DISCONTINUED | OUTPATIENT
Start: 2022-04-29 | End: 2022-04-29 | Stop reason: HOSPADM

## 2022-04-29 RX ORDER — ONDANSETRON 2 MG/ML
4 INJECTION INTRAMUSCULAR; INTRAVENOUS
Status: DISCONTINUED | OUTPATIENT
Start: 2022-04-29 | End: 2022-04-29 | Stop reason: HOSPADM

## 2022-04-29 RX ORDER — CIPROFLOXACIN HYDROCHLORIDE 3.5 MG/ML
1 SOLUTION/ DROPS TOPICAL SEE ADMIN INSTRUCTIONS
Status: DISCONTINUED | OUTPATIENT
Start: 2022-04-29 | End: 2022-04-29 | Stop reason: HOSPADM

## 2022-04-29 RX ORDER — SODIUM CHLORIDE 9 MG/ML
INJECTION, SOLUTION INTRAVENOUS CONTINUOUS
Status: DISCONTINUED | OUTPATIENT
Start: 2022-04-29 | End: 2022-04-29 | Stop reason: HOSPADM

## 2022-04-29 RX ORDER — OFLOXACIN 3 MG/ML
SOLUTION/ DROPS OPHTHALMIC
Status: COMPLETED | OUTPATIENT
Start: 2022-04-29 | End: 2022-04-29

## 2022-04-29 RX ORDER — BALANCED SALT SOLUTION ENRICHED WITH BICARBONATE, DEXTROSE, AND GLUTATHIONE
KIT INTRAOCULAR
Status: COMPLETED | OUTPATIENT
Start: 2022-04-29 | End: 2022-04-29

## 2022-04-29 RX ORDER — SODIUM CHLORIDE 0.9 % (FLUSH) 0.9 %
5-40 SYRINGE (ML) INJECTION PRN
Status: DISCONTINUED | OUTPATIENT
Start: 2022-04-29 | End: 2022-04-29 | Stop reason: HOSPADM

## 2022-04-29 RX ORDER — DIPHENHYDRAMINE HYDROCHLORIDE 50 MG/ML
12.5 INJECTION INTRAMUSCULAR; INTRAVENOUS
Status: DISCONTINUED | OUTPATIENT
Start: 2022-04-29 | End: 2022-04-29 | Stop reason: HOSPADM

## 2022-04-29 RX ORDER — MIDAZOLAM HYDROCHLORIDE 1 MG/ML
INJECTION INTRAMUSCULAR; INTRAVENOUS PRN
Status: DISCONTINUED | OUTPATIENT
Start: 2022-04-29 | End: 2022-04-29 | Stop reason: SDUPTHER

## 2022-04-29 RX ORDER — SODIUM CHLORIDE 9 MG/ML
INJECTION, SOLUTION INTRAVENOUS PRN
Status: DISCONTINUED | OUTPATIENT
Start: 2022-04-29 | End: 2022-04-29 | Stop reason: SDUPTHER

## 2022-04-29 RX ORDER — TROPICAMIDE 10 MG/ML
1 SOLUTION/ DROPS OPHTHALMIC SEE ADMIN INSTRUCTIONS
Status: DISCONTINUED | OUTPATIENT
Start: 2022-04-29 | End: 2022-04-29 | Stop reason: HOSPADM

## 2022-04-29 RX ORDER — BALANCED SALT SOLUTION 6.4; .75; .48; .3; 3.9; 1.7 MG/ML; MG/ML; MG/ML; MG/ML; MG/ML; MG/ML
SOLUTION OPHTHALMIC
Status: COMPLETED | OUTPATIENT
Start: 2022-04-29 | End: 2022-04-29

## 2022-04-29 RX ORDER — SODIUM CHLORIDE 0.9 % (FLUSH) 0.9 %
5-40 SYRINGE (ML) INJECTION EVERY 12 HOURS SCHEDULED
Status: DISCONTINUED | OUTPATIENT
Start: 2022-04-29 | End: 2022-04-29 | Stop reason: SDUPTHER

## 2022-04-29 RX ORDER — BRIMONIDINE TARTRATE 2 MG/ML
SOLUTION/ DROPS OPHTHALMIC
Status: COMPLETED | OUTPATIENT
Start: 2022-04-29 | End: 2022-04-29

## 2022-04-29 RX ADMIN — MIDAZOLAM 2 MG: 1 INJECTION INTRAMUSCULAR; INTRAVENOUS at 10:05

## 2022-04-29 RX ADMIN — TROPICAMIDE 1 DROP: 10 SOLUTION/ DROPS OPHTHALMIC at 09:00

## 2022-04-29 RX ADMIN — PHENYLEPHRINE HYDROCHLORIDE 1 DROP: 25 SOLUTION/ DROPS OPHTHALMIC at 09:09

## 2022-04-29 RX ADMIN — PHENYLEPHRINE HYDROCHLORIDE 1 DROP: 25 SOLUTION/ DROPS OPHTHALMIC at 09:00

## 2022-04-29 RX ADMIN — TROPICAMIDE 1 DROP: 10 SOLUTION/ DROPS OPHTHALMIC at 09:09

## 2022-04-29 RX ADMIN — KETOROLAC TROMETHAMINE 1 DROP: 0.5 SOLUTION OPHTHALMIC at 09:08

## 2022-04-29 RX ADMIN — SODIUM CHLORIDE: 9 INJECTION, SOLUTION INTRAVENOUS at 10:05

## 2022-04-29 RX ADMIN — CIPROFLOXACIN 1 DROP: 3 SOLUTION OPHTHALMIC at 09:08

## 2022-04-29 RX ADMIN — TETRACAINE HYDROCHLORIDE 1 DROP: 5 SOLUTION OPHTHALMIC at 09:08

## 2022-04-29 ASSESSMENT — PAIN SCALES - GENERAL
PAINLEVEL_OUTOF10: 0
PAINLEVEL_OUTOF10: 0

## 2022-04-29 ASSESSMENT — LIFESTYLE VARIABLES: SMOKING_STATUS: 0

## 2022-04-29 ASSESSMENT — PAIN - FUNCTIONAL ASSESSMENT
PAIN_FUNCTIONAL_ASSESSMENT: NONE - DENIES PAIN
PAIN_FUNCTIONAL_ASSESSMENT: 0-10

## 2022-04-29 NOTE — ANESTHESIA PRE PROCEDURE
Department of Anesthesiology  Preprocedure Note       Name:  Teagan Valdez   Age:  64 y.o.  :  1945                                          MRN:  [id number]         Date:  2022      Surgeon: Mary Downs):  Maribel Abbott MD    Procedure: Procedure(s):  PHACOEMULSIFICATION WITH INTRAOCULAR LENS IMPLANT - RIGHT EYE    Medications prior to admission:   Prior to Admission medications    Medication Sig Start Date End Date Taking?  Authorizing Provider   finasteride (PROSCAR) 5 MG tablet Take 5 mg by mouth 9/4/15  Yes Historical Provider, MD   apixaban (ELIQUIS) 5 MG TABS tablet Take 5 mg by mouth 2 times daily   Yes Historical Provider, MD   famotidine (PEPCID) 20 MG tablet Take 20 mg by mouth 2 times daily   Yes Historical Provider, MD   Multiple Vitamins-Minerals (SENIOR MULTIVITAMIN PLUS PO) Take 1 tablet by mouth daily   Yes Historical Provider, MD   Ascorbic Acid (VITAMIN C) 1000 MG tablet Take 1,000 mg by mouth daily   Yes Historical Provider, MD   busPIRone (BUSPAR) 15 MG tablet Take 15 mg by mouth 3 times daily   Yes Historical Provider, MD   Folinic Acid-Vit B6-Vit B12 (FOLINIC-PLUS PO) Take 1 tablet by mouth daily   Yes Historical Provider, MD   lisinopril (PRINIVIL;ZESTRIL) 5 MG tablet Take 5 mg by mouth nightly   Yes Historical Provider, MD   melatonin 10 MG CAPS capsule Take 10 mg by mouth nightly   Yes Historical Provider, MD   OLANZapine (ZYPREXA) 5 MG tablet Take 5 mg by mouth 2 times daily   Yes Historical Provider, MD   mirtazapine (REMERON) 15 MG tablet Take 15 mg by mouth nightly   Yes Historical Provider, MD   albuterol sulfate  (90 Base) MCG/ACT inhaler Inhale 2 puffs into the lungs every 6 hours as needed for Wheezing    Historical Provider, MD   calcium carbonate (TUMS) 500 MG chewable tablet Take 1 tablet by mouth every 4 hours as needed for Heartburn    Historical Provider, MD   sertraline (ZOLOFT) 100 MG tablet Take 100 mg by mouth 2 times daily     Historical Provider, MD   NIFEdipine (PROCARDIA XL) 30 MG extended release tablet TAKE 1 TABLET BY MOUTH DAILY  Patient taking differently: Take 30 mg by mouth 2 times daily  9/28/17   Daisy Rosen MD   tamsulosin (FLOMAX) 0.4 MG capsule TAKE 1 CAPSULE BY MOUTH DAILY  Patient taking differently: Take 0.4 mg by mouth nightly  9/28/17   Daisy Rosen MD   atorvastatin (LIPITOR) 40 MG tablet TAKE ONE TABLET BY MOUTH EVERY EVENING 8/1/17   Daisy Rosen MD   acetaminophen (TYLENOL) 325 MG tablet Take 650 mg by mouth every 6 hours as needed for Pain    Historical Provider, MD       Current medications:    Current Facility-Administered Medications   Medication Dose Route Frequency Provider Last Rate Last Admin    0.9 % sodium chloride infusion   IntraVENous Continuous Rodolfo Doe MD        sodium chloride flush 0.9 % injection 5-40 mL  5-40 mL IntraVENous 2 times per day Rodolfo Doe MD        sodium chloride flush 0.9 % injection 5-40 mL  5-40 mL IntraVENous PRN Rodolfo Doe MD        0.9 % sodium chloride infusion   IntraVENous PRN Rodolfo Doe MD        ciprofloxacin (CILOXAN) 0.3 % ophthalmic solution 1 drop  1 drop Right Eye See Admin Instructions Luke Jimenez MD   1 drop at 04/29/22 0908    phenylephrine (MYDFRIN) 2.5 % ophthalmic solution 1 drop  1 drop Right Eye See Admin Instructions Luke Jimenez MD   1 drop at 04/29/22 1736    povidone-iodine 5 % ophthalmic solution   Right Eye See Admin Instructions Luke Jimenez MD        tetracaine (TETRAVISC) 0.5 % ophthalmic solution 1 drop  1 drop Right Eye See Admin Instructions Luke Jimenez MD   1 drop at 04/29/22 0908    tropicamide (MYDRIACYL) 1 % ophthalmic solution 1 drop  1 drop Right Eye See Admin Instructions Luke Jimenez MD   1 drop at 04/29/22 0909       Allergies:  No Known Allergies    Problem List:    Patient Active Problem List   Diagnosis Code    BPH (benign prostatic hyperplasia) N40.0    HBP (high blood pressure) [facility] (erectile dysfunction) N52.9    Back pain M54.9    Seizure disorder ([address] G40.909    Hyperlipidemia E78.5    Renal cancer ([address] C64.9    Coronary artery disease involving native heart with angina pectoris ([address] I25.119    Atrial flutter (HCC) I48.92    Pacemaker Z95.0    Major depression with psychotic features ([address] F32.3       Past Medical History:        Diagnosis Date    Anginal pain ([address]     Anxiety     AV block, 1st degree 2005    Back pain 3/20/2010    BPH (benign prostatic hyperplasia) 3/20/2010    CAD (coronary artery disease)     Cardiomyopathy ([address]     Carotid artery disease ([address]     CHF (congestive heart failure) ([address]     Colon polyps 9/24/13    Dr Obi Elizondo Depression     Diverticulosis of sigmoid colon 9/24/13    extensive    ED (erectile dysfunction) 3/20/2010    Frontotemporal dementia with behavioral disturbance ([address] 8/19/15    GERD (gastroesophageal reflux disease)     HBP (high blood pressure) 3/20/2010    Headache(784.0)     History of kidney removal 10/27/2008    Hyperlipidemia     Irritable bowel syndrome     MDD (major depressive disorder), recurrent, severe, with psychosis ([address] 8/19/15    PAF (paroxysmal atrial fibrillation) ([address]     Paranoia ([address] 8/19/15    RBBB (right bundle branch block) 2005    chronic    Renal cancer ([address] 10/2008    Right     Seizure disorder ([address] 1975    last     Sleep apnea     wears cpap    SSS (sick sinus syndrome) ([address]     pacemaker    Suicidal ideation 8/19/15    Torn rotator cuff     right       Past Surgical History:        Procedure Laterality Date    APPENDECTOMY      CORONARY ARTERY BYPASS GRAFT  3/2013    quadruple     KIDNEY REMOVAL  10/27/08    right     KNEE ARTHROSCOPY  10/2011    left    PACEMAKER INSERTION  12/20/10    VASECTOMY         Social History:    Social History     Tobacco Use    Smoking status: Former Smoker     Packs/day: 1.00     Years: 20.00     Pack years: 20.00     Quit date: 1999     Years since quittin.3    Smokeless tobacco: Never Used    Tobacco comment: smokes cigars, quit 12   Substance Use Topics    Alcohol use: Yes     Comment: RARE drink wine                                Counseling given: Not Answered  Comment: smokes cigars, quit 12      Vital Signs (Current):   Vitals:    22 1457 22 0906   BP:  (!) 142/62   Pulse:  72   Resp:  17   Temp:  97 °F (36.1 °C)   TempSrc:  Temporal   SpO2:  98%   Weight: 255 lb (115.7 kg) 250 lb (113.4 kg)   Height: 6' 1\" (1.854 m) 6' 1\" (1.854 m)                                              BP Readings from Last 3 Encounters:   22 (!) 142/62   18 (!) 127/56   18 118/71       NPO Status: Time of last liquid consumption:                         Time of last solid consumption:                         Date of last liquid consumption: 22                        Date of last solid food consumption: 22    BMI:   Wt Readings from Last 3 Encounters:   22 250 lb (113.4 kg)   18 226 lb (102.5 kg)   18 226 lb 9.6 oz (102.8 kg)     Body mass index is 32.98 kg/m².     CBC:   Lab Results   Component Value Date    WBC 5.9 2018    RBC 4.95 2018    HGB 13.5 2018    HCT 41.6 2018    MCV 84.0 2018    RDW 16.1 2018     2018       CMP:   Lab Results   Component Value Date     2018    K 4.3 2018     2018    CO2 25 2018    BUN 43 2018    CREATININE 1.4 2018    GFRAA 60 2018    GFRAA >60 2013    AGRATIO 1.2 2018    LABGLOM 49 2018    LABGLOM 90 2012    LABGLOM 74 2012    GLUCOSE 67 2018    PROT 6.6 2018    PROT 7.3 2012    CALCIUM 9.9 2018    BILITOT 0.6 2018    ALKPHOS 83 2018    AST 29 2018    ALT 21 2018       POC Tests: No results for input(s): POCGLU, POCNA, POCK, POCCL, POCBUN, POCHEMO, POCHCT in the last 72 hours. Coags:   Lab Results   Component Value Date    PROTIME 13.4 12/18/2018    INR 1.18 12/18/2018    APTT 35.1 08/30/2012       HCG (If Applicable): No results found for: PREGTESTUR, PREGSERUM, HCG, HCGQUANT     ABGs: No results found for: PHART, PO2ART, KTI7HJI, NGG9IFL, BEART, W2EYARWS     Type & Screen (If Applicable):  Lab Results   Component Value Date    LABABO B 08/30/2012    [address] Positive 08/30/2012       Drug/Infectious Status (If Applicable):  No results found for: HIV, HEPCAB    COVID-19 Screening (If Applicable): No results found for: COVID19        Anesthesia Evaluation  Patient summary reviewed no history of anesthetic complications:   Airway: Mallampati: III  TM distance: >3 FB   Neck ROM: full  Mouth opening: > = 3 FB Dental: normal exam         Pulmonary:   (+) sleep apnea: on CPAP,      (-) not a current smoker                           Cardiovascular:    (+) hypertension:, pacemaker:, past MI: > 6 months, CABG/stent:, dysrhythmias: atrial fibrillation,     (-)  angina       Beta Blocker:  Not on Beta Blocker         Neuro/Psych:   (+) seizures:,    (-) CVA           GI/Hepatic/Renal:   (+) renal disease (Solitary kidney 2/2 nephrectomy for renal cancer):,           Endo/Other:    (+) blood dyscrasia: anticoagulation therapy:., .    (-) diabetes mellitus               Abdominal:   (+) obese,           Vascular: negative vascular ROS. Other Findings:             Anesthesia Plan      MAC     ASA 3       Induction: intravenous. Anesthetic plan and risks discussed with patient. Plan discussed with CRNA. This pre-anesthesia assessment may be used as a history and physical.    DOS STAFF ADDENDUM:    Pt seen and examined, chart reviewed (including anesthesia, drug and allergy history). No interval changes to history and physical examination. Anesthetic plan, risks, benefits, alternatives, and personnel involved discussed with patient.   Patient verbalized an understanding and agrees to proceed.       Rosemarie Genao MD  April 29, 2022  9:54 AM

## 2022-04-29 NOTE — ANESTHESIA POSTPROCEDURE EVALUATION
Department of Anesthesiology  Postprocedure Note    Patient: Cuba Alatorre  MRN: [id number]  YOB: 1945  Date of evaluation: 4/29/2022  Time:  11:25 AM     Procedure Summary     Date: 04/29/22 Room / Location: [facility]    Anesthesia Start: 1003 Anesthesia Stop: 1022    Procedure: PHACOEMULSIFICATION WITH INTRAOCULAR LENS IMPLANT - RIGHT EYE (Right Eye) Diagnosis:       Combined forms of age-related cataract of right eye      (Combined forms of age-related cataract of right eye)    Surgeons: Nicole Mercado MD Responsible Provider: Wes Gill MD    Anesthesia Type: MAC ASA Status: 3          Anesthesia Type: MAC    Zainab Phase I: Zainab Score: 10    Zainab Phase II: Zainab Score: 10    Last vitals: Reviewed and per EMR flowsheets.        Anesthesia Post Evaluation    Patient location during evaluation: bedside  Patient participation: complete - patient participated  Level of consciousness: awake and alert  Pain score: 0  Nausea & Vomiting: no nausea  Complications: no  Cardiovascular status: hemodynamically stable  Respiratory status: acceptable  Hydration status: stable

## 2022-04-29 NOTE — OP NOTE
[facility]    OPERATIVE NOTE    Preoperative Diagnosis: Cataract right eye    Postoperative Diagnosis: Cataract right eye    Procedure: Phacoemulsification with intraocular lens implantation, right eye  Surgeon: Devon Hector MD    Anesthesia: MAC, topical.    Complications: none    Estimated blood loss: less than 1 ml. Specimens: none    Indications for procedure: The patient is a 64y.o. year old with decreased vision, glare and halos around lights, and trouble with activities of daily living. Examination revealed a visually significant cataract in the right eye. Risks, benefits, and alternatives to surgery were discussed with the patient and the patient elected to proceed with phacoemulsification with lens implantation. Details of the procedure: Following informed consent, the patient was taken to the operating room and placed in the supine position. Monitored anesthesia care was administered. The eye was prepped and draped in the usual sterile fashion using aseptic technique for cataract surgery. A side port incision was made. 1% preservative free lidocaine was injected through the side port incision for topical anesthesia. The eye was filled with viscoelastic and a 2.4 mm keratome blade was used to make a 3-plane clear corneal incision in the temporal cornea. The cystitome was used to make a tear in the anterior capsule and a Utrata forceps was used to make a complete curvilinear capsulorrhexis. The lens was hydrodissected and freely rotated. Phacoemulsification was performed. Irrigation/aspiration was used to remove all cortical material from the capsular bag. The eye was filled with viscoelastic and a foldable posterior chamber intraocular lens was injected into the capsular bag. The lens was rotated to the appropriate axis as needed. Irrigation/aspiration was used to remove all excess viscoelastic.   The eye was pressurized with BSS and the wounds were check for leaks and none were found.  The patient had ofloxacin and Alphagan solutions placed on the eye. The patient went to the PACU in excellent condition, having tolerated the procedure well.

## 2022-05-06 NOTE — FLOWSHEET NOTE
Preoperative Screening for Elective Surgery/Invasive Procedures While COVID-19 present in the community     1. Have you tested positive or have been told to self-isolate for COVID-19 like symptoms within the past 28 days? 2. Do you currently have any of the following symptoms? ? Fever >100.0 F or 99.9 F in immunocompromised patients? ? New onset cough, shortness of breath or difficulty breathing? ? New onset sore throat, myalgia (muscle aches and pains), headache, loss of taste/smell or diarrhea? 3. Have you had a potential exposure to COVID-19 within the past 14 days by:  ? Close contact with a confirmed case? ? Close contact with a healthcare worker,  or essential infrastructure worker (grocery store, [employer], gas station, public utilities or transportation)? ? Do you reside in a congregate setting such as; skilled nursing facility, adult home, correctional facility, homeless shelter or other institutional setting? ? Have you had recent travel to a known COVID-19 hotspot? No to all above       * Admitted with diarrhea? [] YES    [x]  NO     *Prior history of C-Diff. In last 3 months? [] YES    [x]  NO     *Antibiotic use in the past 6-8 weeks? [x]  NO    []  YES      If yes, which: REASON_________________     *Prior hospitalization or nursing home in the last month? []  YES    [x]  NO     SAFETY FIRST. .call before you fall    [address] [facility] time____5/13/22 0855________        Surgery time_____1025_______    Do not eat or drink anything after 12:00 midnight prior to your surgery. This includes water chewing gum, mints and ice chips- the Day of Surgery. You may brush your teeth and gargle the morning of your surgery, but do not swallow the water     Please see your family doctor/pediatrician for a history and physical and/or questions concerning medications.    Bring any test results/reports from your physicians office. If you are under the care of a heart doctor or specialist doctor, please be aware that you may be asked to them for clearance    You may be asked to stop blood thinners such as Coumadin, Plavix, Fragmin, Lovenox, etc., or any anti-inflammatories such as:  Aspirin, Ibuprofen, Advil, Naproxen prior to your surgery. We also ask that you stop any OTC medications such as fish oil, vitamin E, glucosamine, garlic, Multivitamins, COQ 10, etc.    We ask that you do not smoke 24 hours prior to surgery  We ask that you do not  drink any alcoholic beverages 24 hours prior to surgery     You must make arrangements for a responsible adult to take you home after your surgery. For your safety you will not be allowed to leave alone or drive yourself home. Your surgery will be cancelled if you do not have a ride home. Also for your safety, it is strongly suggested that someone stay with you the first 24 hours after your surgery. A parent or legal guardian must accompany a child scheduled for surgery and plan to stay at the hospital until the child is discharged. Please do not bring other children with you. For your comfort, please wear simple loose fitting clothing to the hospital.  Please do not bring valuables. Do not wear any make-up or nail polish on your fingers or toes. For your safety, please do not wear any jewelry or body piercing's on the day of surgery. All jewelry must be removed. If you have dentures, they will be removed before going to operating room. For your convenience, we will provide you with a container. If you wear contact lenses or glasses, they will be removed, please bring a case for them. If you have a living will and a durable power of  for healthcare, please bring in a copy.      As part of our patient safety program to minimize surgical site infections, we ask you to do the following:    · Please notify your surgeon if you develop any illness between         now and the day of your surgery. · This includes a cough, cold, fever, sore throat, nausea,         or vomiting, and diarrhea, etc.  ·  Please notify your surgeon if you experience dizziness, shortness         of breath or blurred vision between now and the time of your surgery. Do not shave your operative site 96 hours prior to surgery. For face and neck surgery, men may use an electric razor 48 hours   prior to surgery. You may shower the night before surgery or the morning of   your surgery with an antibacterial soap. You will need to bring a photo ID and insurance card     If you use a C-pap or Bi-pap machine, please bring your machine with you to the hospital     Our goal is to provide you with excellent care, therefore, visitors will be limited to so that we may focus on providing this care for you. Please contact your surgeon office, if you have any further questions. [facility] phone number:  [phone]     [facility] PAT fax number:  [phone]    Please note these are generalized instructions for all surgical cases, you may be provided with more specific instructions according to your surgery.

## 2022-05-11 ENCOUNTER — PREP FOR PROCEDURE (OUTPATIENT)
Dept: OPHTHALMOLOGY | Age: 77
End: 2022-05-11

## 2022-05-11 RX ORDER — SODIUM CHLORIDE 0.9 % (FLUSH) 0.9 %
5-40 SYRINGE (ML) INJECTION EVERY 12 HOURS SCHEDULED
Status: CANCELLED | OUTPATIENT
Start: 2022-05-11

## 2022-05-11 RX ORDER — KETOROLAC TROMETHAMINE 5 MG/ML
1 SOLUTION OPHTHALMIC SEE ADMIN INSTRUCTIONS
Status: CANCELLED | OUTPATIENT
Start: 2022-05-11

## 2022-05-11 RX ORDER — SODIUM CHLORIDE 0.9 % (FLUSH) 0.9 %
5-40 SYRINGE (ML) INJECTION PRN
Status: CANCELLED | OUTPATIENT
Start: 2022-05-11

## 2022-05-11 RX ORDER — CIPROFLOXACIN HYDROCHLORIDE 3.5 MG/ML
1 SOLUTION/ DROPS TOPICAL SEE ADMIN INSTRUCTIONS
Status: CANCELLED | OUTPATIENT
Start: 2022-05-11

## 2022-05-11 RX ORDER — TROPICAMIDE 10 MG/ML
1 SOLUTION/ DROPS OPHTHALMIC SEE ADMIN INSTRUCTIONS
Status: CANCELLED | OUTPATIENT
Start: 2022-05-11

## 2022-05-11 RX ORDER — SODIUM CHLORIDE 9 MG/ML
INJECTION, SOLUTION INTRAVENOUS PRN
Status: CANCELLED | OUTPATIENT
Start: 2022-05-11

## 2022-05-11 RX ORDER — TETRACAINE HYDROCHLORIDE 5 MG/ML
1 SOLUTION OPHTHALMIC SEE ADMIN INSTRUCTIONS
Status: CANCELLED | OUTPATIENT
Start: 2022-05-11

## 2022-05-11 RX ORDER — PHENYLEPHRINE HCL 2.5 %
1 DROPS OPHTHALMIC (EYE) SEE ADMIN INSTRUCTIONS
Status: CANCELLED | OUTPATIENT
Start: 2022-05-11

## 2022-05-12 ENCOUNTER — ANESTHESIA EVENT (OUTPATIENT)
Dept: SURGERY | Age: 77
End: 2022-05-12
Payer: MEDICARE

## 2022-05-12 NOTE — PRE-PROCEDURE INSTRUCTIONS
[address] [facility]  [phone]        Pre-Op Phone Call:     Patient Name: Ivan Kidd     Telephone Information:   Mobile [phone]     Home phone:  [phone]    Surgery Time:   10:25 AM     Arrival Time:  8:55am     Left extended Message:  Yes      Message left with:voicemail      Recent change in health status:  No     Advised of transportation/ policy:  Yes     NPO policy reviewed: Yes     Advised to take morning heart/blood pressure medications with sips of water morning of surgery? Yes     Instructed to bring eye drops, photo identification, and insurance card day of surgery? Yes     Advised to wear short sleeved button down shirt (no T-shirt underneath):  Yes     Advised not to wear jewelry, hairpins, or pantyhose day of surgery? Yes     Advised not to wear make-up and to wash face day of surgery?   Yes    Remarks:        Electronically signed by:  Vesna Tarango RN at 5/12/2022 12:44 PM

## 2022-05-13 ENCOUNTER — ANESTHESIA (OUTPATIENT)
Dept: SURGERY | Age: 77
End: 2022-05-13
Payer: MEDICARE

## 2022-05-13 ENCOUNTER — HOSPITAL ENCOUNTER (OUTPATIENT)
Age: 77
Setting detail: OUTPATIENT SURGERY
Discharge: HOME | End: 2022-05-13
Attending: OPHTHALMOLOGY | Admitting: OPHTHALMOLOGY
Payer: MEDICARE

## 2022-05-13 VITALS — DIASTOLIC BLOOD PRESSURE: 76 MMHG | SYSTOLIC BLOOD PRESSURE: 148 MMHG | OXYGEN SATURATION: 100 %

## 2022-05-13 VITALS
RESPIRATION RATE: 16 BRPM | DIASTOLIC BLOOD PRESSURE: 70 MMHG | HEIGHT: 73 IN | WEIGHT: 250 LBS | OXYGEN SATURATION: 100 % | TEMPERATURE: 96.1 F | SYSTOLIC BLOOD PRESSURE: 154 MMHG | BODY MASS INDEX: 33.13 KG/M2 | HEART RATE: 67 BPM

## 2022-05-13 PROCEDURE — 3600000014 HC SURGERY LEVEL 4 ADDTL 15MIN: Performed by: OPHTHALMOLOGY

## 2022-05-13 PROCEDURE — 2580000003 HC RX 258: Performed by: OPHTHALMOLOGY

## 2022-05-13 PROCEDURE — 6370000000 HC RX 637 (ALT 250 FOR IP): Performed by: OPHTHALMOLOGY

## 2022-05-13 PROCEDURE — 6360000002 HC RX W HCPCS: Performed by: NURSE ANESTHETIST, CERTIFIED REGISTERED

## 2022-05-13 PROCEDURE — 7100000010 HC PHASE II RECOVERY - FIRST 15 MIN: Performed by: OPHTHALMOLOGY

## 2022-05-13 PROCEDURE — 3700000000 HC ANESTHESIA ATTENDED CARE: Performed by: OPHTHALMOLOGY

## 2022-05-13 PROCEDURE — 2709999900 HC NON-CHARGEABLE SUPPLY: Performed by: OPHTHALMOLOGY

## 2022-05-13 PROCEDURE — 7100000011 HC PHASE II RECOVERY - ADDTL 15 MIN: Performed by: OPHTHALMOLOGY

## 2022-05-13 PROCEDURE — 3600000004 HC SURGERY LEVEL 4 BASE: Performed by: OPHTHALMOLOGY

## 2022-05-13 PROCEDURE — 3700000001 HC ADD 15 MINUTES (ANESTHESIA): Performed by: OPHTHALMOLOGY

## 2022-05-13 PROCEDURE — V2632 POST CHMBR INTRAOCULAR LENS: HCPCS | Performed by: OPHTHALMOLOGY

## 2022-05-13 PROCEDURE — 2500000003 HC RX 250 WO HCPCS: Performed by: OPHTHALMOLOGY

## 2022-05-13 DEVICE — LENS IOL SN60WF 21.5D: Type: IMPLANTABLE DEVICE | Site: EYE | Status: FUNCTIONAL

## 2022-05-13 RX ORDER — PHENYLEPHRINE HCL 2.5 %
1 DROPS OPHTHALMIC (EYE) SEE ADMIN INSTRUCTIONS
Status: DISCONTINUED | OUTPATIENT
Start: 2022-05-13 | End: 2022-05-13 | Stop reason: HOSPADM

## 2022-05-13 RX ORDER — BALANCED SALT SOLUTION 6.4; .75; .48; .3; 3.9; 1.7 MG/ML; MG/ML; MG/ML; MG/ML; MG/ML; MG/ML
SOLUTION OPHTHALMIC
Status: COMPLETED | OUTPATIENT
Start: 2022-05-13 | End: 2022-05-13

## 2022-05-13 RX ORDER — OFLOXACIN 3 MG/ML
SOLUTION/ DROPS OPHTHALMIC
Status: COMPLETED | OUTPATIENT
Start: 2022-05-13 | End: 2022-05-13

## 2022-05-13 RX ORDER — SODIUM CHLORIDE 0.9 % (FLUSH) 0.9 %
5-40 SYRINGE (ML) INJECTION EVERY 12 HOURS SCHEDULED
Status: DISCONTINUED | OUTPATIENT
Start: 2022-05-13 | End: 2022-05-13 | Stop reason: HOSPADM

## 2022-05-13 RX ORDER — ONDANSETRON 2 MG/ML
4 INJECTION INTRAMUSCULAR; INTRAVENOUS
Status: CANCELLED | OUTPATIENT
Start: 2022-05-13 | End: 2022-05-13

## 2022-05-13 RX ORDER — TETRACAINE HYDROCHLORIDE 5 MG/ML
1 SOLUTION OPHTHALMIC SEE ADMIN INSTRUCTIONS
Status: DISCONTINUED | OUTPATIENT
Start: 2022-05-13 | End: 2022-05-13 | Stop reason: HOSPADM

## 2022-05-13 RX ORDER — TROPICAMIDE 10 MG/ML
1 SOLUTION/ DROPS OPHTHALMIC SEE ADMIN INSTRUCTIONS
Status: DISCONTINUED | OUTPATIENT
Start: 2022-05-13 | End: 2022-05-13 | Stop reason: HOSPADM

## 2022-05-13 RX ORDER — KETOROLAC TROMETHAMINE 5 MG/ML
1 SOLUTION OPHTHALMIC SEE ADMIN INSTRUCTIONS
Status: COMPLETED | OUTPATIENT
Start: 2022-05-13 | End: 2022-05-13

## 2022-05-13 RX ORDER — SODIUM CHLORIDE 0.9 % (FLUSH) 0.9 %
5-40 SYRINGE (ML) INJECTION PRN
Status: DISCONTINUED | OUTPATIENT
Start: 2022-05-13 | End: 2022-05-13 | Stop reason: HOSPADM

## 2022-05-13 RX ORDER — BRIMONIDINE TARTRATE 2 MG/ML
SOLUTION/ DROPS OPHTHALMIC
Status: COMPLETED | OUTPATIENT
Start: 2022-05-13 | End: 2022-05-13

## 2022-05-13 RX ORDER — BALANCED SALT SOLUTION ENRICHED WITH BICARBONATE, DEXTROSE, AND GLUTATHIONE
KIT INTRAOCULAR
Status: COMPLETED | OUTPATIENT
Start: 2022-05-13 | End: 2022-05-13

## 2022-05-13 RX ORDER — CIPROFLOXACIN HYDROCHLORIDE 3.5 MG/ML
1 SOLUTION/ DROPS TOPICAL SEE ADMIN INSTRUCTIONS
Status: DISCONTINUED | OUTPATIENT
Start: 2022-05-13 | End: 2022-05-13 | Stop reason: HOSPADM

## 2022-05-13 RX ORDER — SODIUM CHLORIDE 0.9 % (FLUSH) 0.9 %
5-40 SYRINGE (ML) INJECTION EVERY 12 HOURS SCHEDULED
Status: CANCELLED | OUTPATIENT
Start: 2022-05-13

## 2022-05-13 RX ORDER — SODIUM CHLORIDE 9 MG/ML
INJECTION, SOLUTION INTRAVENOUS PRN
Status: CANCELLED | OUTPATIENT
Start: 2022-05-13

## 2022-05-13 RX ORDER — SODIUM CHLORIDE 9 MG/ML
INJECTION, SOLUTION INTRAVENOUS PRN
Status: DISCONTINUED | OUTPATIENT
Start: 2022-05-13 | End: 2022-05-13 | Stop reason: HOSPADM

## 2022-05-13 RX ORDER — TETRACAINE HYDROCHLORIDE 5 MG/ML
SOLUTION OPHTHALMIC
Status: COMPLETED | OUTPATIENT
Start: 2022-05-13 | End: 2022-05-13

## 2022-05-13 RX ORDER — MIDAZOLAM HYDROCHLORIDE 1 MG/ML
INJECTION INTRAMUSCULAR; INTRAVENOUS PRN
Status: DISCONTINUED | OUTPATIENT
Start: 2022-05-13 | End: 2022-05-13 | Stop reason: SDUPTHER

## 2022-05-13 RX ORDER — SODIUM CHLORIDE 0.9 % (FLUSH) 0.9 %
5-40 SYRINGE (ML) INJECTION PRN
Status: CANCELLED | OUTPATIENT
Start: 2022-05-13

## 2022-05-13 RX ADMIN — TROPICAMIDE 1 DROP: 10 SOLUTION/ DROPS OPHTHALMIC at 10:17

## 2022-05-13 RX ADMIN — CIPROFLOXACIN 1 DROP: 3 SOLUTION OPHTHALMIC at 10:18

## 2022-05-13 RX ADMIN — PHENYLEPHRINE HYDROCHLORIDE 1 DROP: 25 SOLUTION/ DROPS OPHTHALMIC at 10:26

## 2022-05-13 RX ADMIN — SODIUM CHLORIDE: 9 INJECTION, SOLUTION INTRAVENOUS at 10:23

## 2022-05-13 RX ADMIN — POVIDONE-IODINE: 5 SOLUTION OPHTHALMIC at 10:27

## 2022-05-13 RX ADMIN — TETRACAINE HYDROCHLORIDE 1 DROP: 5 SOLUTION OPHTHALMIC at 10:15

## 2022-05-13 RX ADMIN — PHENYLEPHRINE HYDROCHLORIDE 1 DROP: 25 SOLUTION/ DROPS OPHTHALMIC at 10:19

## 2022-05-13 RX ADMIN — KETOROLAC TROMETHAMINE 1 DROP: 0.5 SOLUTION OPHTHALMIC at 10:16

## 2022-05-13 RX ADMIN — MIDAZOLAM 2 MG: 1 INJECTION INTRAMUSCULAR; INTRAVENOUS at 10:41

## 2022-05-13 RX ADMIN — TROPICAMIDE 1 DROP: 10 SOLUTION/ DROPS OPHTHALMIC at 10:25

## 2022-05-13 ASSESSMENT — LIFESTYLE VARIABLES: SMOKING_STATUS: 0

## 2022-05-13 ASSESSMENT — PAIN - FUNCTIONAL ASSESSMENT: PAIN_FUNCTIONAL_ASSESSMENT: NONE - DENIES PAIN

## 2022-05-13 ASSESSMENT — PAIN SCALES - GENERAL: PAINLEVEL_OUTOF10: 0

## 2022-05-13 NOTE — ANESTHESIA POSTPROCEDURE EVALUATION
Department of Anesthesiology  Postprocedure Note    Patient: Johnathan Diaz  MRN: [id number]  YOB: 1945  Date of evaluation: 5/13/2022  Time:  11:16 AM     Procedure Summary     Date: 05/13/22 Room / Location: [facility]    Anesthesia Start: 7974 Anesthesia Stop: 3671    Procedure: PHACOEMULSIFICATION WITH INTRAOCULAR LENS IMPLANT - LEFT EYE (Left Eye) Diagnosis:       Combined forms of age-related cataract of left eye      (Combined forms of age-related cataract of left eye)    Surgeons: Sariah Sheffield MD Responsible Provider: Sapphire Corrales MD    Anesthesia Type: MAC ASA Status: 3          Anesthesia Type: No value filed. Zainab Phase I: Zainab Score: 10    Zainab Phase II: Zainab Score: 10    Last vitals: Reviewed and per EMR flowsheets.        Anesthesia Post Evaluation    Patient location during evaluation: PACU  Patient participation: complete - patient participated  Level of consciousness: awake and alert  Airway patency: patent  Nausea & Vomiting: no nausea and no vomiting  Complications: no  Cardiovascular status: hemodynamically stable  Respiratory status: acceptable  Hydration status: stable

## 2022-05-13 NOTE — ANESTHESIA PRE PROCEDURE
Department of Anesthesiology  Preprocedure Note       Name:  Em Benites   Age:  64 y.o.  :  1945                                          MRN:  [id number]         Date:  2022      Surgeon: Tyree Floyd):  Isatu Henderson MD    Procedure: Procedure(s):  PHACOEMULSIFICATION WITH INTRAOCULAR LENS IMPLANT - LEFT EYE    Medications prior to admission:   Prior to Admission medications    Medication Sig Start Date End Date Taking?  Authorizing Provider   finasteride (PROSCAR) 5 MG tablet Take 5 mg by mouth 9/4/15   Historical Provider, MD   apixaban (ELIQUIS) 5 MG TABS tablet Take 5 mg by mouth 2 times daily    Historical Provider, MD   famotidine (PEPCID) 20 MG tablet Take 20 mg by mouth 2 times daily    Historical Provider, MD   Multiple Vitamins-Minerals (SENIOR MULTIVITAMIN PLUS PO) Take 1 tablet by mouth daily    Historical Provider, MD   Ascorbic Acid (VITAMIN C) 1000 MG tablet Take 1,000 mg by mouth daily    Historical Provider, MD   busPIRone (BUSPAR) 15 MG tablet Take 15 mg by mouth 3 times daily    Historical Provider, MD   Folinic Acid-Vit B6-Vit B12 (FOLINIC-PLUS PO) Take 1 tablet by mouth daily    Historical Provider, MD   lisinopril (PRINIVIL;ZESTRIL) 5 MG tablet Take 5 mg by mouth nightly    Historical Provider, MD   melatonin 10 MG CAPS capsule Take 10 mg by mouth nightly    Historical Provider, MD   OLANZapine (ZYPREXA) 5 MG tablet Take 5 mg by mouth 2 times daily    Historical Provider, MD   mirtazapine (REMERON) 15 MG tablet Take 15 mg by mouth nightly    Historical Provider, MD   albuterol sulfate  (90 Base) MCG/ACT inhaler Inhale 2 puffs into the lungs every 6 hours as needed for Wheezing    Historical Provider, MD   calcium carbonate (TUMS) 500 MG chewable tablet Take 1 tablet by mouth every 4 hours as needed for Heartburn    Historical Provider, MD   sertraline (ZOLOFT) 100 MG tablet Take 100 mg by mouth 2 times daily     Historical Provider, MD   NIFEdipine (PROCARDIA XL) 30 MG extended release tablet TAKE 1 TABLET BY MOUTH DAILY  Patient taking differently: Take 30 mg by mouth 2 times daily  9/28/17   Babar Pacheco MD   tamsulosin (FLOMAX) 0.4 MG capsule TAKE 1 CAPSULE BY MOUTH DAILY  Patient taking differently: Take 0.4 mg by mouth nightly  9/28/17   Babar Pacheco MD   atorvastatin (LIPITOR) 40 MG tablet TAKE ONE TABLET BY MOUTH EVERY EVENING 8/1/17   Babar Pacheco MD   acetaminophen (TYLENOL) 325 MG tablet Take 650 mg by mouth every 6 hours as needed for Pain    Historical Provider, MD       Current medications:    Current Facility-Administered Medications   Medication Dose Route Frequency Provider Last Rate Last Admin    sodium chloride flush 0.9 % injection 5-40 mL  5-40 mL IntraVENous 2 times per day Jay Bonds MD        sodium chloride flush 0.9 % injection 5-40 mL  5-40 mL IntraVENous PRN Jay Bonds MD        0.9 % sodium chloride infusion   IntraVENous PRN Jay Bonds MD        0.9 % sodium chloride infusion   IntraVENous PRN Katie Yanez MD        sodium chloride flush 0.9 % injection 5-40 mL  5-40 mL IntraVENous 2 times per day Katie Yanez MD        sodium chloride flush 0.9 % injection 5-40 mL  5-40 mL IntraVENous PRN Katie Yanez MD        ciprofloxacin (CILOXAN) 0.3 % ophthalmic solution 1 drop  1 drop Left Eye See Admin Instructions Katie Yanez MD        ketorolac (ACULAR) 0.5 % ophthalmic solution 1 drop  1 drop Left Eye See Admin Instructions Katie Yanez MD        phenylephrine (MYDFRIN) 2.5 % ophthalmic solution 1 drop  1 drop Left Eye See Admin Instructions Katie Yanez MD        povidone-iodine 5 % ophthalmic solution   Left Eye See Admin Instructions Katie Yanez MD        tetracaine (TETRAVISC) 0.5 % ophthalmic solution 1 drop  1 drop Left Eye See Admin Instructions Katie Yanez MD        tropicamide (MYDRIACYL) 1 % ophthalmic solution 1 drop  1 drop Left Eye See [facility] [place] [facility] Instructions Katie Yanez MD Allergies:  No Known Allergies    Problem List:    Patient Active Problem List   Diagnosis Code    BPH (benign prostatic hyperplasia) N40.0    HBP (high blood pressure) I10    ED (erectile dysfunction) N52.9    Back pain M54.9    Seizure disorder ([address] G40.909    Hyperlipidemia E78.5    Renal cancer ([address] C64.9    Coronary artery disease involving native heart with angina pectoris ([address] I25.119    Atrial flutter ([address] I48.92    Pacemaker Z95.0    Major depression with psychotic features ([address] F32.3       Past Medical History:        Diagnosis Date    Anginal pain ([address]     Anxiety     AV block, 1st degree 2005    Back pain 3/20/2010    BPH (benign prostatic hyperplasia) 3/20/2010    CAD (coronary artery disease)     Cardiomyopathy ([address]     Carotid artery disease ([address]     CHF (congestive heart failure) ([address]     Colon polyps 9/24/13    Dr Man Lara Depression     Diverticulosis of sigmoid colon 9/24/13    extensive    ED (erectile dysfunction) 3/20/2010    Frontotemporal dementia with behavioral disturbance ([address] 8/19/15    GERD (gastroesophageal reflux disease)     HBP (high blood pressure) 3/20/2010    Headache(784.0)     History of kidney removal 10/27/2008    Hyperlipidemia     Irritable bowel syndrome     MDD (major depressive disorder), recurrent, severe, with psychosis ([address] 8/19/15    PAF (paroxysmal atrial fibrillation) ([address]     Paranoia ([address] 8/19/15    RBBB (right bundle branch block) 2005    chronic    Renal cancer ([address] 10/2008    Right     Seizure disorder ([address] 1975    last     Sleep apnea     wears cpap    SSS (sick sinus syndrome) ([address]     pacemaker    Suicidal ideation 8/19/15    Torn rotator cuff     right       Past Surgical History:        Procedure Laterality Date    APPENDECTOMY      CORONARY ARTERY BYPASS GRAFT  3/2013    quadruple     EYE SURGERY      INTRACAPSULAR CATARACT EXTRACTION Right 4/29/2022    PHACOEMULSIFICATION WITH INTRAOCULAR LENS IMPLANT - RIGHT EYE performed by Murtaza Schmitz MD at [address]  10/27/08    right     KNEE ARTHROSCOPY  10/2011    left    PACEMAKER INSERTION  12/20/10    PACEMAKER PLACEMENT      VASECTOMY         Social History:    Social History     Tobacco Use    Smoking status: Former Smoker     Packs/day: 1.00     Years: 20.00     Pack years: 20.00     Quit date: 1999     Years since quittin.3    Smokeless tobacco: Never Used    Tobacco comment: smokes cigars, quit 12   Substance Use Topics    Alcohol use: Yes     Comment: RARE drink wine                                Counseling given: Not Answered  Comment: smokes cigars, quit 12      Vital Signs (Current):   Vitals:    22 1019 22 1019   BP:  (!) 144/65   Pulse:  69   Resp:  14   Temp:  96.5 °F (35.8 °C)   TempSrc:  Temporal   SpO2:  96%   Weight: 250 lb (113.4 kg) 250 lb (113.4 kg)   Height: 6' 1\" (1.854 m) 6' 1\" (1.854 m)                                              BP Readings from Last 3 Encounters:   22 (!) 144/65   22 125/64   22 131/65       NPO Status: Time of last liquid consumption:                         Time of last solid consumption:                         Date of last liquid consumption: 22                        Date of last solid food consumption: 22    BMI:   Wt Readings from Last 3 Encounters:   22 250 lb (113.4 kg)   22 250 lb (113.4 kg)   18 226 lb (102.5 kg)     Body mass index is 32.98 kg/m².     CBC:   Lab Results   Component Value Date    WBC 5.9 2018    RBC 4.95 2018    HGB 13.5 2018    HCT 41.6 2018    MCV 84.0 2018    RDW 16.1 2018     2018       CMP:   Lab Results   Component Value Date     2018    K 4.3 2018     2018    CO2 25 2018    BUN 43 2018    CREATININE 1.4 2018    GFRAA 60 2018    GFRAA >60 02/25/2013    AGRATIO 1.2 12/18/2018    LABGLOM 49 12/18/2018    LABGLOM 90 08/30/2012    LABGLOM 74 08/30/2012    GLUCOSE 67 12/18/2018    PROT 6.6 12/18/2018    PROT 7.3 07/17/2012    CALCIUM 9.9 12/18/2018    BILITOT 0.6 12/18/2018    ALKPHOS 83 12/18/2018    AST 29 12/18/2018    ALT 21 12/18/2018       POC Tests: No results for input(s): POCGLU, POCNA, POCK, POCCL, POCBUN, POCHEMO, POCHCT in the last 72 hours. Coags:   Lab Results   Component Value Date    PROTIME 13.4 12/18/2018    INR 1.18 12/18/2018    APTT 35.1 08/30/2012       HCG (If Applicable): No results found for: PREGTESTUR, PREGSERUM, HCG, HCGQUANT     ABGs: No results found for: PHART, PO2ART, EQA2RDB, KJK9VJS, BEART, T9CMHHOS     Type & Screen (If Applicable):  Lab Results   Component Value Date    LABABO B 08/30/2012    [address] Positive 08/30/2012       Drug/Infectious Status (If Applicable):  No results found for: HIV, HEPCAB    COVID-19 Screening (If Applicable): No results found for: COVID19        Anesthesia Evaluation  Patient summary reviewed no history of anesthetic complications:   Airway: Mallampati: III  TM distance: >3 FB   Neck ROM: full  Mouth opening: > = 3 FB Dental:      Comment: No loose teeth    Pulmonary:normal exam    (+) sleep apnea: on CPAP,      (-) not a current smoker                           Cardiovascular:    (+) hypertension:, pacemaker: pacemaker, past MI: > 6 months, CAD:, CABG/stent (cabg 2015):, dysrhythmias: atrial fibrillation and atrial flutter,     (-)  angina       Beta Blocker:  Not on Beta Blocker         Neuro/Psych:   (+) seizures:, headaches:, psychiatric history:   (-) CVA           GI/Hepatic/Renal:   (+) GERD:, renal disease (Solitary kidney 2/2 nephrectomy for renal cancer):,           Endo/Other:    (+) blood dyscrasia: anticoagulation therapy:., .    (-) diabetes mellitus               Abdominal:   (+) obese,           Vascular: negative vascular ROS.          Other Findings: Anesthesia Plan      MAC     ASA 3       Induction: intravenous. Anesthetic plan and risks discussed with patient. Plan discussed with CRNA. This pre-anesthesia assessment may be used as a history and physical.    DOS STAFF ADDENDUM:    Pt seen and examined, chart reviewed (including anesthesia, drug and allergy history). No interval changes to history and physical examination. Anesthetic plan, risks, benefits, alternatives, and personnel involved discussed with patient. Patient verbalized an understanding and agrees to proceed.       Aik Russell MD  May 13, 2022  10:21 AM

## 2022-05-13 NOTE — OP NOTE
Leann Bernard    OPERATIVE NOTE    Preoperative Diagnosis: Cataract left eye    Postoperative Diagnosis: Cataract left eye    Procedure: Phacoemulsification with intraocular lens implantation, left eye  Surgeon: Yadi Jacob MD    Anesthesia: MAC, topical.    Complications: none    Estimated blood loss: less than 1 ml. Specimens: none    Indications for procedure: The patient is a 64y.o. year old with decreased vision, glare and halos around lights, and trouble with activities of daily living. Examination revealed a visually significant cataract in the left eye. Risks, benefits, and alternatives to surgery were discussed with the patient and the patient elected to proceed with phacoemulsification with lens implantation. Details of the procedure: Following informed consent, the patient was taken to the operating room and placed in the supine position. Monitored anesthesia care was administered. The eye was prepped and draped in the usual sterile fashion using aseptic technique for cataract surgery. A side port incision was made. 1% preservative free lidocaine was injected through the side port incision for topical anesthesia. The eye was filled with viscoelastic and a 2.4 mm keratome blade was used to make a 3-plane clear corneal incision in the temporal cornea. The cystitome was used to make a tear in the anterior capsule and a [employer] forceps was used to make a complete curvilinear capsulorrhexis. The lens was hydrodissected and freely rotated. Phacoemulsification was performed. Irrigation/aspiration was used to remove all cortical material from the capsular bag. The eye was filled with viscoelastic and a foldable posterior chamber intraocular lens was injected into the capsular bag. The lens was rotated to the appropriate axis as needed. Irrigation/aspiration was used to remove all excess viscoelastic.   The eye was pressurized with BSS and the wounds were check for leaks and none were found.  The patient had ofloxacin and Alphagan solutions placed on the eye. The patient went to the PACU in excellent condition, having tolerated the procedure well.

## 2024-03-07 LAB — PROSTATE SPECIFIC ANTIGEN: 0.7 NG/ML

## 2025-01-31 ENCOUNTER — TRANSCRIBE ORDERS (OUTPATIENT)
Dept: ADMINISTRATIVE | Age: 80
End: 2025-01-31

## 2025-01-31 DIAGNOSIS — R25.1 TREMOR: Primary | ICD-10-CM

## 2025-02-06 ENCOUNTER — TRANSCRIBE ORDERS (OUTPATIENT)
Dept: ADMINISTRATIVE | Age: 80
End: 2025-02-06

## 2025-02-06 DIAGNOSIS — R25.1 TREMOR: Primary | ICD-10-CM

## 2025-02-27 ENCOUNTER — HOSPITAL ENCOUNTER (OUTPATIENT)
Dept: NUCLEAR MEDICINE | Age: 80
Discharge: HOME | End: 2025-02-27
Payer: MEDICARE

## 2025-02-27 DIAGNOSIS — R25.1 TREMOR: ICD-10-CM

## 2025-02-27 PROCEDURE — 2500000003 HC RX 250 WO HCPCS: Performed by: PSYCHIATRY & NEUROLOGY

## 2025-02-27 PROCEDURE — 6370000000 HC RX 637 (ALT 250 FOR IP): Performed by: PSYCHIATRY & NEUROLOGY

## 2025-02-27 PROCEDURE — 3430000000 HC RX DIAGNOSTIC RADIOPHARMACEUTICAL: Performed by: PSYCHIATRY & NEUROLOGY

## 2025-02-27 PROCEDURE — 78803 RP LOCLZJ TUM SPECT 1 AREA: CPT

## 2025-02-27 PROCEDURE — A9584 IODINE I-123 IOFLUPANE: HCPCS | Performed by: PSYCHIATRY & NEUROLOGY

## 2025-02-27 RX ORDER — IODINE SOLUTION STRONG 5% (LUGOL'S) 5 %
4 SOLUTION ORAL
Status: COMPLETED | OUTPATIENT
Start: 2025-02-27 | End: 2025-02-27

## 2025-02-27 RX ORDER — SODIUM CHLORIDE 0.9 % (FLUSH) 0.9 %
5-40 SYRINGE (ML) INJECTION PRN
Status: DISCONTINUED | OUTPATIENT
Start: 2025-02-27 | End: 2025-02-28 | Stop reason: HOSPADM

## 2025-02-27 RX ADMIN — IOFLUPANE I-123 4.99 MILLICURIE: 2 INJECTION, SOLUTION INTRAVENOUS at 10:37

## 2025-02-27 RX ADMIN — IODINE SOLUTION STRONG 5% (LUGOL'S) 0.2 ML: 5 SOLUTION at 09:10

## 2025-02-27 RX ADMIN — SODIUM CHLORIDE, PRESERVATIVE FREE 10 ML: 5 INJECTION INTRAVENOUS at 10:37

## (undated) DEVICE — SOLUTION IRRIG 500ML STRL H2O NONPYROGENIC

## (undated) DEVICE — SOLUTION IRRIG BSS ST 500ML

## (undated) DEVICE — Z DISCONTINUED USE 2131664 WIPE INSTR W3XL3IN NONLINTING

## (undated) DEVICE — SYRINGE MED 3ML CLR PLAS STD N CTRL LUERLOCK TIP DISP

## (undated) DEVICE — Device

## (undated) DEVICE — SURGICAL PROC PACK SHT WEST V4

## (undated) DEVICE — Device: Brand: MEDEX

## (undated) DEVICE — SYRINGE MED 30ML STD CLR PLAS LUERLOCK TIP N CTRL DISP

## (undated) DEVICE — GLOVE SURG SZ 75 CRM LTX FREE POLYISOPRENE POLYMER BEAD ANTI

## (undated) DEVICE — SYRINGE TB 1ML NDL 25GA L0.625IN PLAS SLIP TIP CONVENTIONAL